# Patient Record
Sex: FEMALE | Race: OTHER | HISPANIC OR LATINO | URBAN - METROPOLITAN AREA
[De-identification: names, ages, dates, MRNs, and addresses within clinical notes are randomized per-mention and may not be internally consistent; named-entity substitution may affect disease eponyms.]

---

## 2017-01-27 ENCOUNTER — OUTPATIENT (OUTPATIENT)
Dept: OUTPATIENT SERVICES | Facility: HOSPITAL | Age: 67
LOS: 1 days | End: 2017-01-27
Payer: COMMERCIAL

## 2017-01-27 VITALS
RESPIRATION RATE: 16 BRPM | HEART RATE: 68 BPM | HEIGHT: 59 IN | TEMPERATURE: 99 F | SYSTOLIC BLOOD PRESSURE: 120 MMHG | DIASTOLIC BLOOD PRESSURE: 70 MMHG | WEIGHT: 128.97 LBS

## 2017-01-27 DIAGNOSIS — C00.9 MALIGNANT NEOPLASM OF LIP, UNSPECIFIED: ICD-10-CM

## 2017-01-27 DIAGNOSIS — Z98.890 OTHER SPECIFIED POSTPROCEDURAL STATES: Chronic | ICD-10-CM

## 2017-01-27 DIAGNOSIS — R74.8 ABNORMAL LEVELS OF OTHER SERUM ENZYMES: ICD-10-CM

## 2017-01-27 DIAGNOSIS — Z01.818 ENCOUNTER FOR OTHER PREPROCEDURAL EXAMINATION: ICD-10-CM

## 2017-01-27 DIAGNOSIS — Z90.710 ACQUIRED ABSENCE OF BOTH CERVIX AND UTERUS: Chronic | ICD-10-CM

## 2017-01-27 LAB
ALBUMIN SERPL ELPH-MCNC: 4.3 G/DL — SIGNIFICANT CHANGE UP (ref 3.3–5)
ALP SERPL-CCNC: 89 U/L — SIGNIFICANT CHANGE UP (ref 40–120)
ALT FLD-CCNC: 77 U/L — HIGH (ref 4–33)
AST SERPL-CCNC: 70 U/L — HIGH (ref 4–32)
BILIRUB SERPL-MCNC: 0.7 MG/DL — SIGNIFICANT CHANGE UP (ref 0.2–1.2)
BUN SERPL-MCNC: 17 MG/DL — SIGNIFICANT CHANGE UP (ref 7–23)
CALCIUM SERPL-MCNC: 9.6 MG/DL — SIGNIFICANT CHANGE UP (ref 8.4–10.5)
CHLORIDE SERPL-SCNC: 103 MMOL/L — SIGNIFICANT CHANGE UP (ref 98–107)
CO2 SERPL-SCNC: 26 MMOL/L — SIGNIFICANT CHANGE UP (ref 22–31)
CREAT SERPL-MCNC: 0.76 MG/DL — SIGNIFICANT CHANGE UP (ref 0.5–1.3)
GLUCOSE SERPL-MCNC: 87 MG/DL — SIGNIFICANT CHANGE UP (ref 70–99)
HCT VFR BLD CALC: 45.6 % — HIGH (ref 34.5–45)
HGB BLD-MCNC: 15.5 G/DL — SIGNIFICANT CHANGE UP (ref 11.5–15.5)
MCHC RBC-ENTMCNC: 30.8 PG — SIGNIFICANT CHANGE UP (ref 27–34)
MCHC RBC-ENTMCNC: 34 % — SIGNIFICANT CHANGE UP (ref 32–36)
MCV RBC AUTO: 90.5 FL — SIGNIFICANT CHANGE UP (ref 80–100)
PLATELET # BLD AUTO: 188 K/UL — SIGNIFICANT CHANGE UP (ref 150–400)
PMV BLD: 11.5 FL — SIGNIFICANT CHANGE UP (ref 7–13)
POTASSIUM SERPL-MCNC: 4.1 MMOL/L — SIGNIFICANT CHANGE UP (ref 3.5–5.3)
POTASSIUM SERPL-SCNC: 4.1 MMOL/L — SIGNIFICANT CHANGE UP (ref 3.5–5.3)
PROT SERPL-MCNC: 7.1 G/DL — SIGNIFICANT CHANGE UP (ref 6–8.3)
RBC # BLD: 5.04 M/UL — SIGNIFICANT CHANGE UP (ref 3.8–5.2)
RBC # FLD: 12.5 % — SIGNIFICANT CHANGE UP (ref 10.3–14.5)
SODIUM SERPL-SCNC: 143 MMOL/L — SIGNIFICANT CHANGE UP (ref 135–145)
WBC # BLD: 4.26 K/UL — SIGNIFICANT CHANGE UP (ref 3.8–10.5)
WBC # FLD AUTO: 4.26 K/UL — SIGNIFICANT CHANGE UP (ref 3.8–10.5)

## 2017-01-27 PROCEDURE — 93010 ELECTROCARDIOGRAM REPORT: CPT

## 2017-01-27 NOTE — H&P PST ADULT - MUSCULOSKELETAL
details… detailed exam no joint erythema/no calf tenderness/ROM intact/no joint warmth/no joint swelling

## 2017-01-27 NOTE — H&P PST ADULT - PROBLEM SELECTOR PLAN 1
Scheduled for Excision lower lip lesion on 2/3/2017.  Preop instructions provided and pt verbalizes understanding.  Labs done and results pending.  Famotidine provided with instructions. Medical clearance done. Will obtain.

## 2017-01-27 NOTE — H&P PST ADULT - FAMILY HISTORY
Father  Still living? Unknown  Family history of heart attack, Age at diagnosis: Age Unknown     Sibling  Still living? No  Family history of heart attack, Age at diagnosis: Age Unknown
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
## 2017-01-27 NOTE — H&P PST ADULT - PMH
Elevated liver enzymes  is being followed up by gasteroenterologist  Fibroid uterus    Hyperlipidemia

## 2017-01-27 NOTE — H&P PST ADULT - NSANTHOSAYNRD_GEN_A_CORE
No. DICKSON screening performed.  STOP BANG Legend: 0-2 = LOW Risk; 3-4 = INTERMEDIATE Risk; 5-8 = HIGH Risk

## 2017-01-27 NOTE — H&P PST ADULT - NEGATIVE ALLERGY TYPES
no reactions to medicines/no reactions to food/no indoor environmental allergies/no reactions to animals/no outdoor environmental allergies

## 2017-02-02 ENCOUNTER — RESULT REVIEW (OUTPATIENT)
Age: 67
End: 2017-02-02

## 2017-02-03 ENCOUNTER — OUTPATIENT (OUTPATIENT)
Dept: OUTPATIENT SERVICES | Facility: HOSPITAL | Age: 67
LOS: 1 days | Discharge: HOME | End: 2017-02-03
Payer: COMMERCIAL

## 2017-02-03 ENCOUNTER — APPOINTMENT (OUTPATIENT)
Dept: SURGERY | Facility: HOSPITAL | Age: 67
End: 2017-02-03

## 2017-02-03 ENCOUNTER — OTHER (OUTPATIENT)
Age: 67
End: 2017-02-03

## 2017-02-03 VITALS
DIASTOLIC BLOOD PRESSURE: 55 MMHG | WEIGHT: 128.97 LBS | RESPIRATION RATE: 16 BRPM | HEIGHT: 59 IN | OXYGEN SATURATION: 96 % | SYSTOLIC BLOOD PRESSURE: 117 MMHG | TEMPERATURE: 98 F | HEART RATE: 67 BPM

## 2017-02-03 VITALS
OXYGEN SATURATION: 100 % | RESPIRATION RATE: 19 BRPM | TEMPERATURE: 97 F | DIASTOLIC BLOOD PRESSURE: 50 MMHG | HEART RATE: 69 BPM | SYSTOLIC BLOOD PRESSURE: 108 MMHG

## 2017-02-03 DIAGNOSIS — C00.9 MALIGNANT NEOPLASM OF LIP, UNSPECIFIED: ICD-10-CM

## 2017-02-03 DIAGNOSIS — Z90.710 ACQUIRED ABSENCE OF BOTH CERVIX AND UTERUS: Chronic | ICD-10-CM

## 2017-02-03 DIAGNOSIS — Z98.890 OTHER SPECIFIED POSTPROCEDURAL STATES: Chronic | ICD-10-CM

## 2017-02-03 PROCEDURE — 40510 PARTIAL EXCISION OF LIP: CPT

## 2017-02-03 PROCEDURE — 88305 TISSUE EXAM BY PATHOLOGIST: CPT | Mod: 26

## 2017-02-03 PROCEDURE — 13132 CMPLX RPR F/C/C/M/N/AX/G/H/F: CPT | Mod: 59

## 2017-02-03 NOTE — ASU DISCHARGE PLAN (ADULT/PEDIATRIC). - INSTRUCTIONS
Advance to regular diet as tolerated. Keep well hydrated. Iced compresses or ice packs to lower lip to decrease swelling and pain several times per day for 15 minutes.

## 2017-02-03 NOTE — ASU DISCHARGE PLAN (ADULT/PEDIATRIC). - NOTIFY
Persistent Nausea and Vomiting/Inability to Tolerate Liquids or Foods/Pain not relieved by Medications/Swelling that continues/Bleeding that does not stop/Fever greater than 101/Unable to Urinate

## 2017-02-06 LAB — SURGICAL PATHOLOGY STUDY: SIGNIFICANT CHANGE UP

## 2017-02-07 PROBLEM — D25.9 LEIOMYOMA OF UTERUS, UNSPECIFIED: Chronic | Status: ACTIVE | Noted: 2017-01-27

## 2017-02-07 PROBLEM — R74.8 ABNORMAL LEVELS OF OTHER SERUM ENZYMES: Chronic | Status: ACTIVE | Noted: 2017-01-27

## 2017-02-16 ENCOUNTER — APPOINTMENT (OUTPATIENT)
Dept: SURGERY | Facility: CLINIC | Age: 67
End: 2017-02-16

## 2017-06-22 ENCOUNTER — APPOINTMENT (OUTPATIENT)
Dept: SURGERY | Facility: CLINIC | Age: 67
End: 2017-06-22

## 2017-06-29 ENCOUNTER — EMERGENCY (EMERGENCY)
Facility: HOSPITAL | Age: 67
LOS: 1 days | Discharge: HOME | End: 2017-06-29
Attending: EMERGENCY MEDICINE | Admitting: EMERGENCY MEDICINE
Payer: COMMERCIAL

## 2017-06-29 VITALS
TEMPERATURE: 98 F | RESPIRATION RATE: 18 BRPM | SYSTOLIC BLOOD PRESSURE: 141 MMHG | WEIGHT: 130.07 LBS | DIASTOLIC BLOOD PRESSURE: 71 MMHG | HEIGHT: 62 IN | HEART RATE: 85 BPM | OXYGEN SATURATION: 97 %

## 2017-06-29 DIAGNOSIS — Z90.710 ACQUIRED ABSENCE OF BOTH CERVIX AND UTERUS: Chronic | ICD-10-CM

## 2017-06-29 DIAGNOSIS — Z98.890 OTHER SPECIFIED POSTPROCEDURAL STATES: Chronic | ICD-10-CM

## 2017-06-29 LAB
ALBUMIN SERPL ELPH-MCNC: 4 G/DL — SIGNIFICANT CHANGE UP (ref 3.3–5)
ALP SERPL-CCNC: 80 U/L — SIGNIFICANT CHANGE UP (ref 30–120)
ALT FLD-CCNC: 40 U/L DA — SIGNIFICANT CHANGE UP (ref 10–60)
ANION GAP SERPL CALC-SCNC: 10 MMOL/L — SIGNIFICANT CHANGE UP (ref 5–17)
AST SERPL-CCNC: 46 U/L — HIGH (ref 10–40)
BASOPHILS # BLD AUTO: 0.1 K/UL — SIGNIFICANT CHANGE UP (ref 0–0.2)
BASOPHILS NFR BLD AUTO: 1 % — SIGNIFICANT CHANGE UP (ref 0–2)
BILIRUB SERPL-MCNC: 0.4 MG/DL — SIGNIFICANT CHANGE UP (ref 0.2–1.2)
BUN SERPL-MCNC: 12 MG/DL — SIGNIFICANT CHANGE UP (ref 7–23)
CALCIUM SERPL-MCNC: 8.9 MG/DL — SIGNIFICANT CHANGE UP (ref 8.4–10.5)
CHLORIDE SERPL-SCNC: 106 MMOL/L — SIGNIFICANT CHANGE UP (ref 96–108)
CO2 SERPL-SCNC: 28 MMOL/L — SIGNIFICANT CHANGE UP (ref 22–31)
CREAT SERPL-MCNC: 0.84 MG/DL — SIGNIFICANT CHANGE UP (ref 0.5–1.3)
EOSINOPHIL # BLD AUTO: 0.1 K/UL — SIGNIFICANT CHANGE UP (ref 0–0.5)
EOSINOPHIL NFR BLD AUTO: 1.7 % — SIGNIFICANT CHANGE UP (ref 0–6)
GLUCOSE SERPL-MCNC: 122 MG/DL — HIGH (ref 70–99)
HCT VFR BLD CALC: 46.6 % — HIGH (ref 34.5–45)
HGB BLD-MCNC: 15.2 G/DL — SIGNIFICANT CHANGE UP (ref 11.5–15.5)
LIDOCAIN IGE QN: 191 U/L — SIGNIFICANT CHANGE UP (ref 73–393)
LYMPHOCYTES # BLD AUTO: 1.2 K/UL — SIGNIFICANT CHANGE UP (ref 1–3.3)
LYMPHOCYTES # BLD AUTO: 19.1 % — SIGNIFICANT CHANGE UP (ref 13–44)
MCHC RBC-ENTMCNC: 29.8 PG — SIGNIFICANT CHANGE UP (ref 27–34)
MCHC RBC-ENTMCNC: 32.6 GM/DL — SIGNIFICANT CHANGE UP (ref 32–36)
MCV RBC AUTO: 91.6 FL — SIGNIFICANT CHANGE UP (ref 80–100)
MONOCYTES # BLD AUTO: 0.3 K/UL — SIGNIFICANT CHANGE UP (ref 0–0.9)
MONOCYTES NFR BLD AUTO: 4.5 % — SIGNIFICANT CHANGE UP (ref 2–14)
NEUTROPHILS # BLD AUTO: 4.8 K/UL — SIGNIFICANT CHANGE UP (ref 1.8–7.4)
NEUTROPHILS NFR BLD AUTO: 73.7 % — SIGNIFICANT CHANGE UP (ref 43–77)
PLATELET # BLD AUTO: 194 K/UL — SIGNIFICANT CHANGE UP (ref 150–400)
POTASSIUM SERPL-MCNC: 3.1 MMOL/L — LOW (ref 3.5–5.3)
POTASSIUM SERPL-SCNC: 3.1 MMOL/L — LOW (ref 3.5–5.3)
PROT SERPL-MCNC: 7.2 G/DL — SIGNIFICANT CHANGE UP (ref 6–8.3)
RBC # BLD: 5.09 M/UL — SIGNIFICANT CHANGE UP (ref 3.8–5.2)
RBC # FLD: 11.4 % — SIGNIFICANT CHANGE UP (ref 10.3–14.5)
SODIUM SERPL-SCNC: 144 MMOL/L — SIGNIFICANT CHANGE UP (ref 135–145)
WBC # BLD: 6.5 K/UL — SIGNIFICANT CHANGE UP (ref 3.8–10.5)
WBC # FLD AUTO: 6.5 K/UL — SIGNIFICANT CHANGE UP (ref 3.8–10.5)

## 2017-06-29 PROCEDURE — 99284 EMERGENCY DEPT VISIT MOD MDM: CPT

## 2017-06-29 RX ORDER — SODIUM CHLORIDE 9 MG/ML
1000 INJECTION INTRAMUSCULAR; INTRAVENOUS; SUBCUTANEOUS ONCE
Qty: 0 | Refills: 0 | Status: COMPLETED | OUTPATIENT
Start: 2017-06-29 | End: 2017-06-29

## 2017-06-29 RX ORDER — POTASSIUM CHLORIDE 20 MEQ
40 PACKET (EA) ORAL ONCE
Qty: 0 | Refills: 0 | Status: COMPLETED | OUTPATIENT
Start: 2017-06-29 | End: 2017-06-29

## 2017-06-29 RX ORDER — FAMOTIDINE 10 MG/ML
20 INJECTION INTRAVENOUS ONCE
Qty: 0 | Refills: 0 | Status: COMPLETED | OUTPATIENT
Start: 2017-06-29 | End: 2017-06-29

## 2017-06-29 RX ORDER — ONDANSETRON 8 MG/1
4 TABLET, FILM COATED ORAL ONCE
Qty: 0 | Refills: 0 | Status: COMPLETED | OUTPATIENT
Start: 2017-06-29 | End: 2017-06-29

## 2017-06-29 RX ADMIN — SODIUM CHLORIDE 2000 MILLILITER(S): 9 INJECTION INTRAMUSCULAR; INTRAVENOUS; SUBCUTANEOUS at 22:50

## 2017-06-29 RX ADMIN — ONDANSETRON 4 MILLIGRAM(S): 8 TABLET, FILM COATED ORAL at 22:50

## 2017-06-29 RX ADMIN — FAMOTIDINE 20 MILLIGRAM(S): 10 INJECTION INTRAVENOUS at 22:50

## 2017-06-29 NOTE — ED PROVIDER NOTE - OBJECTIVE STATEMENT
66 y/o F presents to the ED c/o multiple episodes of vomiting and diffuse abdominal pain since 2000 today. Pt felt well during the day and went out to eat tonight. She draink one mojito. Pt's daughter notes that she ate the same foods that pt did except pt had an empanada appetizer. Pt's daughter also notes that pt's liver enzymes have been elevated recently. Denies fever, chills, diarrhea or any other complaints. PMHx: Hyperlipidemia, fibroid uterus. PSHx: lip surgery. 66 y/o F presents to the ED c/o multiple episodes of vomiting and diffuse abdominal pain since 2000 today. Pt felt well during the day and went out to eat tonight. She drank one mojito. Pt's daughter notes that she ate the same foods that pt did except pt had an empanada appetizer. Pt's daughter also notes that pt's liver enzymes have been elevated recently. Denies fever, chills, diarrhea or any other complaints. PMHx: Hyperlipidemia, fibroid uterus. PSHx: lip surgery.

## 2017-06-30 VITALS
OXYGEN SATURATION: 93 % | TEMPERATURE: 98 F | HEART RATE: 79 BPM | DIASTOLIC BLOOD PRESSURE: 65 MMHG | RESPIRATION RATE: 19 BRPM | SYSTOLIC BLOOD PRESSURE: 109 MMHG

## 2017-06-30 PROCEDURE — 96375 TX/PRO/DX INJ NEW DRUG ADDON: CPT

## 2017-06-30 PROCEDURE — 85027 COMPLETE CBC AUTOMATED: CPT

## 2017-06-30 PROCEDURE — 83690 ASSAY OF LIPASE: CPT

## 2017-06-30 PROCEDURE — 96361 HYDRATE IV INFUSION ADD-ON: CPT

## 2017-06-30 PROCEDURE — 80053 COMPREHEN METABOLIC PANEL: CPT

## 2017-06-30 PROCEDURE — 96374 THER/PROPH/DIAG INJ IV PUSH: CPT

## 2017-06-30 PROCEDURE — 99284 EMERGENCY DEPT VISIT MOD MDM: CPT | Mod: 25

## 2017-06-30 RX ORDER — ONDANSETRON 8 MG/1
1 TABLET, FILM COATED ORAL
Qty: 10 | Refills: 0 | OUTPATIENT
Start: 2017-06-30

## 2017-06-30 RX ADMIN — Medication 40 MILLIEQUIVALENT(S): at 01:01

## 2017-10-24 ENCOUNTER — APPOINTMENT (OUTPATIENT)
Dept: SURGERY | Facility: CLINIC | Age: 67
End: 2017-10-24
Payer: COMMERCIAL

## 2017-10-24 PROCEDURE — 99213 OFFICE O/P EST LOW 20 MIN: CPT

## 2018-02-17 ENCOUNTER — RESULT REVIEW (OUTPATIENT)
Age: 68
End: 2018-02-17

## 2018-02-27 ENCOUNTER — OUTPATIENT (OUTPATIENT)
Dept: OUTPATIENT SERVICES | Facility: HOSPITAL | Age: 68
LOS: 1 days | End: 2018-02-27
Payer: COMMERCIAL

## 2018-02-27 ENCOUNTER — APPOINTMENT (OUTPATIENT)
Dept: MAMMOGRAPHY | Facility: CLINIC | Age: 68
End: 2018-02-27
Payer: COMMERCIAL

## 2018-02-27 ENCOUNTER — APPOINTMENT (OUTPATIENT)
Dept: ULTRASOUND IMAGING | Facility: CLINIC | Age: 68
End: 2018-02-27
Payer: COMMERCIAL

## 2018-02-27 DIAGNOSIS — E78.5 HYPERLIPIDEMIA, UNSPECIFIED: ICD-10-CM

## 2018-02-27 DIAGNOSIS — C08.9 MALIGNANT NEOPLASM OF MAJOR SALIVARY GLAND, UNSPECIFIED: ICD-10-CM

## 2018-02-27 DIAGNOSIS — Z00.8 ENCOUNTER FOR OTHER GENERAL EXAMINATION: ICD-10-CM

## 2018-02-27 DIAGNOSIS — Z90.710 ACQUIRED ABSENCE OF BOTH CERVIX AND UTERUS: Chronic | ICD-10-CM

## 2018-02-27 DIAGNOSIS — Z98.890 OTHER SPECIFIED POSTPROCEDURAL STATES: Chronic | ICD-10-CM

## 2018-02-27 PROCEDURE — G0279: CPT | Mod: 26

## 2018-02-27 PROCEDURE — 76641 ULTRASOUND BREAST COMPLETE: CPT | Mod: 26,50

## 2018-02-27 PROCEDURE — 76641 ULTRASOUND BREAST COMPLETE: CPT

## 2018-02-27 PROCEDURE — 77066 DX MAMMO INCL CAD BI: CPT | Mod: 26

## 2018-02-27 PROCEDURE — G0279: CPT

## 2018-02-27 PROCEDURE — 77066 DX MAMMO INCL CAD BI: CPT

## 2018-04-24 ENCOUNTER — APPOINTMENT (OUTPATIENT)
Dept: SURGERY | Facility: CLINIC | Age: 68
End: 2018-04-24
Payer: COMMERCIAL

## 2018-04-24 PROCEDURE — 99213 OFFICE O/P EST LOW 20 MIN: CPT

## 2018-05-13 ENCOUNTER — INPATIENT (INPATIENT)
Facility: HOSPITAL | Age: 68
LOS: 2 days | Discharge: HOME | DRG: 871 | End: 2018-05-16
Attending: INTERNAL MEDICINE | Admitting: INTERNAL MEDICINE
Payer: COMMERCIAL

## 2018-05-13 VITALS
RESPIRATION RATE: 20 BRPM | DIASTOLIC BLOOD PRESSURE: 57 MMHG | OXYGEN SATURATION: 87 % | SYSTOLIC BLOOD PRESSURE: 112 MMHG | HEART RATE: 112 BPM | TEMPERATURE: 99 F | HEIGHT: 61 IN | WEIGHT: 139.99 LBS

## 2018-05-13 DIAGNOSIS — R74.8 ABNORMAL LEVELS OF OTHER SERUM ENZYMES: ICD-10-CM

## 2018-05-13 DIAGNOSIS — Z90.710 ACQUIRED ABSENCE OF BOTH CERVIX AND UTERUS: Chronic | ICD-10-CM

## 2018-05-13 DIAGNOSIS — D25.9 LEIOMYOMA OF UTERUS, UNSPECIFIED: ICD-10-CM

## 2018-05-13 DIAGNOSIS — Z98.890 OTHER SPECIFIED POSTPROCEDURAL STATES: Chronic | ICD-10-CM

## 2018-05-13 DIAGNOSIS — J18.9 PNEUMONIA, UNSPECIFIED ORGANISM: ICD-10-CM

## 2018-05-13 DIAGNOSIS — Z29.9 ENCOUNTER FOR PROPHYLACTIC MEASURES, UNSPECIFIED: ICD-10-CM

## 2018-05-13 DIAGNOSIS — E78.5 HYPERLIPIDEMIA, UNSPECIFIED: ICD-10-CM

## 2018-05-13 DIAGNOSIS — J20.9 ACUTE BRONCHITIS, UNSPECIFIED: ICD-10-CM

## 2018-05-13 LAB
ALBUMIN SERPL ELPH-MCNC: 3.8 G/DL — SIGNIFICANT CHANGE UP (ref 3.3–5)
ALP SERPL-CCNC: 64 U/L — SIGNIFICANT CHANGE UP (ref 30–120)
ALT FLD-CCNC: 27 U/L DA — SIGNIFICANT CHANGE UP (ref 10–60)
ANION GAP SERPL CALC-SCNC: 10 MMOL/L — SIGNIFICANT CHANGE UP (ref 5–17)
AST SERPL-CCNC: 21 U/L — SIGNIFICANT CHANGE UP (ref 10–40)
BILIRUB SERPL-MCNC: 0.9 MG/DL — SIGNIFICANT CHANGE UP (ref 0.2–1.2)
BUN SERPL-MCNC: 16 MG/DL — SIGNIFICANT CHANGE UP (ref 7–23)
CALCIUM SERPL-MCNC: 8.8 MG/DL — SIGNIFICANT CHANGE UP (ref 8.4–10.5)
CHLORIDE SERPL-SCNC: 102 MMOL/L — SIGNIFICANT CHANGE UP (ref 96–108)
CK SERPL-CCNC: 225 U/L — HIGH (ref 26–192)
CO2 SERPL-SCNC: 24 MMOL/L — SIGNIFICANT CHANGE UP (ref 22–31)
CREAT SERPL-MCNC: 0.76 MG/DL — SIGNIFICANT CHANGE UP (ref 0.5–1.3)
GLUCOSE SERPL-MCNC: 149 MG/DL — HIGH (ref 70–99)
HCT VFR BLD CALC: 39.2 % — SIGNIFICANT CHANGE UP (ref 34.5–45)
HGB BLD-MCNC: 13.9 G/DL — SIGNIFICANT CHANGE UP (ref 11.5–15.5)
LACTATE SERPL-SCNC: 2.2 MMOL/L — HIGH (ref 0.7–2)
LACTATE SERPL-SCNC: 2.9 MMOL/L — HIGH (ref 0.7–2)
LACTATE SERPL-SCNC: 3.6 MMOL/L — HIGH (ref 0.7–2)
LACTATE SERPL-SCNC: 5.4 MMOL/L — CRITICAL HIGH (ref 0.7–2)
MCHC RBC-ENTMCNC: 30.2 PG — SIGNIFICANT CHANGE UP (ref 27–34)
MCHC RBC-ENTMCNC: 35.4 GM/DL — SIGNIFICANT CHANGE UP (ref 32–36)
MCV RBC AUTO: 85.2 FL — SIGNIFICANT CHANGE UP (ref 80–100)
PLATELET # BLD AUTO: 214 K/UL — SIGNIFICANT CHANGE UP (ref 150–400)
POTASSIUM SERPL-MCNC: 3.8 MMOL/L — SIGNIFICANT CHANGE UP (ref 3.5–5.3)
POTASSIUM SERPL-SCNC: 3.8 MMOL/L — SIGNIFICANT CHANGE UP (ref 3.5–5.3)
PROT SERPL-MCNC: 7.1 G/DL — SIGNIFICANT CHANGE UP (ref 6–8.3)
RAPID RVP RESULT: DETECTED
RBC # BLD: 4.6 M/UL — SIGNIFICANT CHANGE UP (ref 3.8–5.2)
RBC # FLD: 10.5 % — SIGNIFICANT CHANGE UP (ref 10.3–14.5)
RV+EV RNA SPEC QL NAA+PROBE: DETECTED
SODIUM SERPL-SCNC: 136 MMOL/L — SIGNIFICANT CHANGE UP (ref 135–145)
TROPONIN I SERPL-MCNC: 0.04 NG/ML — SIGNIFICANT CHANGE UP (ref 0.02–0.06)
WBC # BLD: 12 K/UL — HIGH (ref 3.8–10.5)
WBC # FLD AUTO: 12 K/UL — HIGH (ref 3.8–10.5)

## 2018-05-13 PROCEDURE — 71046 X-RAY EXAM CHEST 2 VIEWS: CPT | Mod: 26

## 2018-05-13 PROCEDURE — 93010 ELECTROCARDIOGRAM REPORT: CPT

## 2018-05-13 PROCEDURE — 71250 CT THORAX DX C-: CPT | Mod: 26

## 2018-05-13 PROCEDURE — 99285 EMERGENCY DEPT VISIT HI MDM: CPT

## 2018-05-13 PROCEDURE — 12345: CPT | Mod: NC

## 2018-05-13 PROCEDURE — 99223 1ST HOSP IP/OBS HIGH 75: CPT | Mod: AI

## 2018-05-13 RX ORDER — ENOXAPARIN SODIUM 100 MG/ML
40 INJECTION SUBCUTANEOUS DAILY
Qty: 0 | Refills: 0 | Status: DISCONTINUED | OUTPATIENT
Start: 2018-05-13 | End: 2018-05-16

## 2018-05-13 RX ORDER — BENZOCAINE AND MENTHOL 5; 1 G/100ML; G/100ML
1 LIQUID ORAL
Qty: 0 | Refills: 0 | Status: DISCONTINUED | OUTPATIENT
Start: 2018-05-13 | End: 2018-05-16

## 2018-05-13 RX ORDER — SODIUM CHLORIDE 9 MG/ML
1000 INJECTION INTRAMUSCULAR; INTRAVENOUS; SUBCUTANEOUS
Qty: 0 | Refills: 0 | Status: DISCONTINUED | OUTPATIENT
Start: 2018-05-13 | End: 2018-05-15

## 2018-05-13 RX ORDER — IPRATROPIUM/ALBUTEROL SULFATE 18-103MCG
3 AEROSOL WITH ADAPTER (GRAM) INHALATION EVERY 6 HOURS
Qty: 0 | Refills: 0 | Status: DISCONTINUED | OUTPATIENT
Start: 2018-05-13 | End: 2018-05-16

## 2018-05-13 RX ORDER — SODIUM CHLORIDE 9 MG/ML
1000 INJECTION INTRAMUSCULAR; INTRAVENOUS; SUBCUTANEOUS
Qty: 0 | Refills: 0 | Status: DISCONTINUED | OUTPATIENT
Start: 2018-05-13 | End: 2018-05-13

## 2018-05-13 RX ORDER — SODIUM CHLORIDE 9 MG/ML
1000 INJECTION INTRAMUSCULAR; INTRAVENOUS; SUBCUTANEOUS ONCE
Qty: 0 | Refills: 0 | Status: COMPLETED | OUTPATIENT
Start: 2018-05-13 | End: 2018-05-13

## 2018-05-13 RX ORDER — PANTOPRAZOLE SODIUM 20 MG/1
40 TABLET, DELAYED RELEASE ORAL DAILY
Qty: 0 | Refills: 0 | Status: DISCONTINUED | OUTPATIENT
Start: 2018-05-13 | End: 2018-05-15

## 2018-05-13 RX ORDER — IPRATROPIUM/ALBUTEROL SULFATE 18-103MCG
3 AEROSOL WITH ADAPTER (GRAM) INHALATION ONCE
Qty: 0 | Refills: 0 | Status: COMPLETED | OUTPATIENT
Start: 2018-05-13 | End: 2018-05-13

## 2018-05-13 RX ORDER — ACETAMINOPHEN 500 MG
325 TABLET ORAL EVERY 4 HOURS
Qty: 0 | Refills: 0 | Status: DISCONTINUED | OUTPATIENT
Start: 2018-05-13 | End: 2018-05-16

## 2018-05-13 RX ORDER — ONDANSETRON 8 MG/1
4 TABLET, FILM COATED ORAL ONCE
Qty: 0 | Refills: 0 | Status: DISCONTINUED | OUTPATIENT
Start: 2018-05-13 | End: 2018-05-16

## 2018-05-13 RX ADMIN — Medication 3 MILLILITER(S): at 06:20

## 2018-05-13 RX ADMIN — PANTOPRAZOLE SODIUM 40 MILLIGRAM(S): 20 TABLET, DELAYED RELEASE ORAL at 11:32

## 2018-05-13 RX ADMIN — Medication 125 MILLIGRAM(S): at 06:34

## 2018-05-13 RX ADMIN — BENZOCAINE AND MENTHOL 1 LOZENGE: 5; 1 LIQUID ORAL at 19:05

## 2018-05-13 RX ADMIN — Medication 3 MILLILITER(S): at 06:53

## 2018-05-13 RX ADMIN — ENOXAPARIN SODIUM 40 MILLIGRAM(S): 100 INJECTION SUBCUTANEOUS at 11:32

## 2018-05-13 RX ADMIN — Medication 40 MILLIGRAM(S): at 09:54

## 2018-05-13 RX ADMIN — Medication 3 MILLILITER(S): at 13:38

## 2018-05-13 RX ADMIN — SODIUM CHLORIDE 1000 MILLILITER(S): 9 INJECTION INTRAMUSCULAR; INTRAVENOUS; SUBCUTANEOUS at 06:26

## 2018-05-13 RX ADMIN — Medication 3 MILLILITER(S): at 06:50

## 2018-05-13 RX ADMIN — SODIUM CHLORIDE 1000 MILLILITER(S): 9 INJECTION INTRAMUSCULAR; INTRAVENOUS; SUBCUTANEOUS at 07:23

## 2018-05-13 RX ADMIN — SODIUM CHLORIDE 1000 MILLILITER(S): 9 INJECTION INTRAMUSCULAR; INTRAVENOUS; SUBCUTANEOUS at 15:13

## 2018-05-13 RX ADMIN — Medication 3 MILLILITER(S): at 19:52

## 2018-05-13 NOTE — ED PROVIDER NOTE - MEDICAL DECISION MAKING DETAILS
labs cxr ekg enzymes cultures lactate Duo x 3, Solumedrol labs cxr ekg enzymes cultures lactate Duo x 3, Solumedrol, Levaquin

## 2018-05-13 NOTE — H&P ADULT - PMH
Elevated liver enzymes  is being followed up by gasteroenterologist  Fibroid uterus    Hyperlipidemia mother

## 2018-05-13 NOTE — CONSULT NOTE ADULT - ASSESSMENT
Pt with acute pneumonia, bronchitis. Pt with acute pneumonia, bronchitis.  Mild sepsis with borderline lactate.

## 2018-05-13 NOTE — ED PROVIDER NOTE - CARE PLAN
Principal Discharge DX:	Upper respiratory infection  Goal:	R/O Pneumonia  Secondary Diagnosis:	Hyperactive airway disease Principal Discharge DX:	Pneumonia  Goal:	R/O Pneumonia  Secondary Diagnosis:	Hyperactive airway disease
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
## 2018-05-13 NOTE — ED ADULT NURSE REASSESSMENT NOTE - NS ED NURSE REASSESS COMMENT FT1
Pt coughed moderate amount green sputum. Sputum c/s sent to lab.
as per pt, shortness of breath, productive cough w/ white sputum since yesterday. Seen at clinic who started her on po AB. Bilat lungs diminished BS throughout w/ clear sounds to anterior upper left lobe. O2 sat 86% on room air. Started on Duoneb STAT - O2 sat 100%. Pt afebrile at triage. RR 21-23. Skin warm, dry.
lungs diminished to right base otherwise clear to ascultation denies pains feeling better now pt pending observation and dispo spouse at bedside

## 2018-05-13 NOTE — H&P ADULT - PROBLEM SELECTOR PLAN 1
WBC -  Lactate- 5.4   CT chest showed    IV Levofloxacin , given 4 litres of IV NS , solumedrol and bronchodilators WBC -  Lactate- 5.4   CT chest showed Complete collapse of the right middle lobe on the current exam. No   obvious mass at the right hilum. Right middle lobe bronchus is aerated.    No focal lung consolidation.    Unchanged 7.5 mm nodule in the left lower lobe compared to prior exam   from 11/19/2016.  Continue with  IV Levofloxacin , given 4 litres of IV NS , solumedrol and bronchodilators.   Monitor Telemetry.

## 2018-05-13 NOTE — ED PROVIDER NOTE - CONSTITUTIONAL, MLM
normal... Well appearing, well nourished, awake, alert, oriented to person, place, time/situation and in  moderate distress.

## 2018-05-13 NOTE — H&P ADULT - ASSESSMENT
67 y/o With PMHx of  Elevated liver enzymes, Fibroid uterus  Hyperlipidemia came in to Ed with cough and Shortness of breath and is admitted for Sepsis due toPneumonia and acute bronchitis.

## 2018-05-13 NOTE — ED PROVIDER NOTE - OBJECTIVE STATEMENT
69 y/o WF H/O Elevated liver enzymes, Fibroid uterus  Hyperlipidemia.  C/C SOB, cough with productive green sputum since Saturday morning. She was seen by urgent care and was started on Amoxil and hydrocodone for URI. Last evening her breathing was more labored with wheezing. She was chest pain with cough, no fever, no chills, no radiation of pain, no nausea, no vomiting. 69 y/o WF H/O Elevated liver enzymes, Fibroid uterus  Hyperlipidemia.  C/C SOB, cough with productive green sputum since Saturday morning. She was seen by urgent care and was started on Amoxil and hydrocodone for URI. Last evening her breathing was more labored with wheezing. She was chest pain with cough, no fever, no chills, no radiation of pain, no nausea, no vomiting. Her room air Sat in triage was 86%.

## 2018-05-13 NOTE — H&P ADULT - HISTORY OF PRESENT ILLNESS
69 y/o With PMHx of  Elevated liver enzymes, Fibroid uterus  Hyperlipidemia came in to Ed with cough and Shortness of breath. .  C/C SOB, cough with productive green sputum since Saturday morning. She was seen by urgent care and was started on Amoxil and hydrocodone for URI. Last evening her breathing was more labored with wheezing. She was chest pain with cough, no fever, no chills, no radiation of pain, no nausea, no vomiting. Her room air Sat in triage was 86%. 67 y/o With PMHx of  Elevated liver enzymes, Fibroid uterus  Hyperlipidemia came in to Ed with cough and Shortness of breath. . Patient started having  cough since friday night.It worsened on saturday , developed  productive green sputum since Saturday morning. She was seen by urgent care and was started on Amoxicillin  and hydrocodone for URI. Last evening - saturday night her breathing was more labored with wheezing. She started having pleuritic  chest pain with cough,  chills, and nausea, no vomiting.  She also c/o throat pain and irritation . She denied in Chest pain , palpitation , dizziness or headache .   In ED Vital Signs Last 24 Hrs T(C): 36.9 (13 May 2018 07:34), Max: 37.4 (13 May 2018 05:59) T(F): 98.5 (13 May 2018 07:34), Max: 99.3 (13 May 2018 05:59) HR: 105 (13 May 2018 16:20) (99 - 118) BP: 130/54 (13 May 2018 16:20) (112/56 - 130/54) BP(mean): -- ABP: -- ABP(mean): -- RR: 22 (13 May 2018 10:12) (20 - 22) SpO2: 94% (13 May 2018 16:20) (87% - 100%)  She was started IV Levofloxacin , given 4 litres of IV NS , solumedrol and bronchodilators  .  Her lactate went up to 5.4.  She was evaluated by Pulmonary Dr Calix.

## 2018-05-13 NOTE — H&P ADULT - PROBLEM SELECTOR PLAN 2
Follow up   BC , Sputum cultures   Urine for legionella.   C/W IV Levofloxacin.   Monitor Fever curve .   Pulmonary evaluation from Dr rose appreciated.

## 2018-05-14 DIAGNOSIS — A41.9 SEPSIS, UNSPECIFIED ORGANISM: ICD-10-CM

## 2018-05-14 LAB
ALBUMIN SERPL ELPH-MCNC: 2.8 G/DL — LOW (ref 3.3–5)
ALP SERPL-CCNC: 47 U/L — SIGNIFICANT CHANGE UP (ref 30–120)
ALT FLD-CCNC: 20 U/L DA — SIGNIFICANT CHANGE UP (ref 10–60)
ANION GAP SERPL CALC-SCNC: 7 MMOL/L — SIGNIFICANT CHANGE UP (ref 5–17)
AST SERPL-CCNC: 11 U/L — SIGNIFICANT CHANGE UP (ref 10–40)
BASOPHILS # BLD AUTO: 0 K/UL — SIGNIFICANT CHANGE UP (ref 0–0.2)
BASOPHILS NFR BLD AUTO: 0.2 % — SIGNIFICANT CHANGE UP (ref 0–2)
BILIRUB SERPL-MCNC: 0.3 MG/DL — SIGNIFICANT CHANGE UP (ref 0.2–1.2)
BUN SERPL-MCNC: 13 MG/DL — SIGNIFICANT CHANGE UP (ref 7–23)
CALCIUM SERPL-MCNC: 8.3 MG/DL — LOW (ref 8.4–10.5)
CHLORIDE SERPL-SCNC: 112 MMOL/L — HIGH (ref 96–108)
CHOLEST SERPL-MCNC: 174 MG/DL — SIGNIFICANT CHANGE UP (ref 10–199)
CO2 SERPL-SCNC: 25 MMOL/L — SIGNIFICANT CHANGE UP (ref 22–31)
CREAT SERPL-MCNC: 0.7 MG/DL — SIGNIFICANT CHANGE UP (ref 0.5–1.3)
EOSINOPHIL # BLD AUTO: 0 K/UL — SIGNIFICANT CHANGE UP (ref 0–0.5)
EOSINOPHIL NFR BLD AUTO: 0.1 % — SIGNIFICANT CHANGE UP (ref 0–6)
GLUCOSE SERPL-MCNC: 127 MG/DL — HIGH (ref 70–99)
GRAM STN FLD: SIGNIFICANT CHANGE UP
HBA1C BLD-MCNC: 5.7 % — HIGH (ref 4–5.6)
HCT VFR BLD CALC: 33.9 % — LOW (ref 34.5–45)
HDLC SERPL-MCNC: 43 MG/DL — SIGNIFICANT CHANGE UP (ref 40–125)
HGB BLD-MCNC: 12 G/DL — SIGNIFICANT CHANGE UP (ref 11.5–15.5)
LACTATE SERPL-SCNC: 1.4 MMOL/L — SIGNIFICANT CHANGE UP (ref 0.7–2)
LEGIONELLA AG UR QL: NEGATIVE — SIGNIFICANT CHANGE UP
LIPID PNL WITH DIRECT LDL SERPL: 115 MG/DL — SIGNIFICANT CHANGE UP
LYMPHOCYTES # BLD AUTO: 0.9 K/UL — LOW (ref 1–3.3)
LYMPHOCYTES # BLD AUTO: 7.7 % — LOW (ref 13–44)
MCHC RBC-ENTMCNC: 31.3 PG — SIGNIFICANT CHANGE UP (ref 27–34)
MCHC RBC-ENTMCNC: 35.5 GM/DL — SIGNIFICANT CHANGE UP (ref 32–36)
MCV RBC AUTO: 88.3 FL — SIGNIFICANT CHANGE UP (ref 80–100)
MONOCYTES # BLD AUTO: 0.7 K/UL — SIGNIFICANT CHANGE UP (ref 0–0.9)
MONOCYTES NFR BLD AUTO: 6 % — SIGNIFICANT CHANGE UP (ref 2–14)
NEUTROPHILS # BLD AUTO: 9.6 K/UL — HIGH (ref 1.8–7.4)
NEUTROPHILS NFR BLD AUTO: 86 % — HIGH (ref 43–77)
PLATELET # BLD AUTO: 165 K/UL — SIGNIFICANT CHANGE UP (ref 150–400)
POTASSIUM SERPL-MCNC: 3.7 MMOL/L — SIGNIFICANT CHANGE UP (ref 3.5–5.3)
POTASSIUM SERPL-SCNC: 3.7 MMOL/L — SIGNIFICANT CHANGE UP (ref 3.5–5.3)
PROT SERPL-MCNC: 5.7 G/DL — LOW (ref 6–8.3)
RBC # BLD: 3.84 M/UL — SIGNIFICANT CHANGE UP (ref 3.8–5.2)
RBC # FLD: 11 % — SIGNIFICANT CHANGE UP (ref 10.3–14.5)
SODIUM SERPL-SCNC: 144 MMOL/L — SIGNIFICANT CHANGE UP (ref 135–145)
SPECIMEN SOURCE: SIGNIFICANT CHANGE UP
TOTAL CHOLESTEROL/HDL RATIO MEASUREMENT: 4 RATIO — SIGNIFICANT CHANGE UP (ref 3.3–7.1)
TRIGL SERPL-MCNC: 78 MG/DL — SIGNIFICANT CHANGE UP (ref 10–149)
TSH SERPL-MCNC: 0.68 UIU/ML — SIGNIFICANT CHANGE UP (ref 0.27–4.2)
WBC # BLD: 11.2 K/UL — HIGH (ref 3.8–10.5)
WBC # FLD AUTO: 11.2 K/UL — HIGH (ref 3.8–10.5)

## 2018-05-14 PROCEDURE — 71045 X-RAY EXAM CHEST 1 VIEW: CPT | Mod: 26

## 2018-05-14 PROCEDURE — 99233 SBSQ HOSP IP/OBS HIGH 50: CPT

## 2018-05-14 RX ADMIN — PANTOPRAZOLE SODIUM 40 MILLIGRAM(S): 20 TABLET, DELAYED RELEASE ORAL at 12:46

## 2018-05-14 RX ADMIN — Medication 3 MILLILITER(S): at 01:17

## 2018-05-14 RX ADMIN — Medication 200 MILLIGRAM(S): at 23:16

## 2018-05-14 RX ADMIN — Medication 3 MILLILITER(S): at 13:34

## 2018-05-14 RX ADMIN — Medication 3 MILLILITER(S): at 07:46

## 2018-05-14 RX ADMIN — ENOXAPARIN SODIUM 40 MILLIGRAM(S): 100 INJECTION SUBCUTANEOUS at 12:45

## 2018-05-14 RX ADMIN — Medication 40 MILLIGRAM(S): at 05:57

## 2018-05-14 RX ADMIN — Medication 3 MILLILITER(S): at 19:41

## 2018-05-14 RX ADMIN — SODIUM CHLORIDE 100 MILLILITER(S): 9 INJECTION INTRAMUSCULAR; INTRAVENOUS; SUBCUTANEOUS at 16:07

## 2018-05-14 RX ADMIN — Medication 200 MILLIGRAM(S): at 17:13

## 2018-05-15 ENCOUNTER — RESULT REVIEW (OUTPATIENT)
Age: 68
End: 2018-05-15

## 2018-05-15 LAB
CULTURE RESULTS: SIGNIFICANT CHANGE UP
FOLATE SERPL-MCNC: 8.8 NG/ML — SIGNIFICANT CHANGE UP
HCT VFR BLD CALC: 34.8 % — SIGNIFICANT CHANGE UP (ref 34.5–45)
HGB BLD-MCNC: 11.8 G/DL — SIGNIFICANT CHANGE UP (ref 11.5–15.5)
IRON SATN MFR SERPL: 38 UG/DL — SIGNIFICANT CHANGE UP (ref 30–160)
MCHC RBC-ENTMCNC: 29.6 PG — SIGNIFICANT CHANGE UP (ref 27–34)
MCHC RBC-ENTMCNC: 33.8 GM/DL — SIGNIFICANT CHANGE UP (ref 32–36)
MCV RBC AUTO: 87.5 FL — SIGNIFICANT CHANGE UP (ref 80–100)
PLATELET # BLD AUTO: 172 K/UL — SIGNIFICANT CHANGE UP (ref 150–400)
RBC # BLD: 3.98 M/UL — SIGNIFICANT CHANGE UP (ref 3.8–5.2)
RBC # FLD: 11 % — SIGNIFICANT CHANGE UP (ref 10.3–14.5)
SPECIMEN SOURCE: SIGNIFICANT CHANGE UP
VIT B12 SERPL-MCNC: 721 PG/ML — SIGNIFICANT CHANGE UP (ref 232–1245)
WBC # BLD: 8.4 K/UL — SIGNIFICANT CHANGE UP (ref 3.8–10.5)
WBC # FLD AUTO: 8.4 K/UL — SIGNIFICANT CHANGE UP (ref 3.8–10.5)

## 2018-05-15 PROCEDURE — 88305 TISSUE EXAM BY PATHOLOGIST: CPT | Mod: 26

## 2018-05-15 PROCEDURE — 99233 SBSQ HOSP IP/OBS HIGH 50: CPT

## 2018-05-15 PROCEDURE — 71046 X-RAY EXAM CHEST 2 VIEWS: CPT | Mod: 26

## 2018-05-15 PROCEDURE — 88108 CYTOPATH CONCENTRATE TECH: CPT | Mod: 26

## 2018-05-15 RX ORDER — PANTOPRAZOLE SODIUM 20 MG/1
40 TABLET, DELAYED RELEASE ORAL
Qty: 0 | Refills: 0 | Status: DISCONTINUED | OUTPATIENT
Start: 2018-05-15 | End: 2018-05-16

## 2018-05-15 RX ADMIN — Medication 3 MILLILITER(S): at 13:55

## 2018-05-15 RX ADMIN — Medication 3 MILLILITER(S): at 18:34

## 2018-05-15 RX ADMIN — Medication 200 MILLIGRAM(S): at 17:31

## 2018-05-15 RX ADMIN — Medication 200 MILLIGRAM(S): at 11:43

## 2018-05-15 RX ADMIN — ENOXAPARIN SODIUM 40 MILLIGRAM(S): 100 INJECTION SUBCUTANEOUS at 11:43

## 2018-05-15 RX ADMIN — Medication 200 MILLIGRAM(S): at 05:47

## 2018-05-15 RX ADMIN — Medication 200 MILLIGRAM(S): at 23:16

## 2018-05-15 RX ADMIN — Medication 40 MILLIGRAM(S): at 05:47

## 2018-05-15 RX ADMIN — Medication 3 MILLILITER(S): at 07:38

## 2018-05-16 ENCOUNTER — TRANSCRIPTION ENCOUNTER (OUTPATIENT)
Age: 68
End: 2018-05-16

## 2018-05-16 VITALS
OXYGEN SATURATION: 94 % | DIASTOLIC BLOOD PRESSURE: 66 MMHG | SYSTOLIC BLOOD PRESSURE: 117 MMHG | TEMPERATURE: 99 F | HEART RATE: 105 BPM | RESPIRATION RATE: 18 BRPM

## 2018-05-16 PROCEDURE — 87449 NOS EACH ORGANISM AG IA: CPT

## 2018-05-16 PROCEDURE — 87633 RESP VIRUS 12-25 TARGETS: CPT

## 2018-05-16 PROCEDURE — 71045 X-RAY EXAM CHEST 1 VIEW: CPT

## 2018-05-16 PROCEDURE — 85027 COMPLETE CBC AUTOMATED: CPT

## 2018-05-16 PROCEDURE — 88305 TISSUE EXAM BY PATHOLOGIST: CPT

## 2018-05-16 PROCEDURE — 87040 BLOOD CULTURE FOR BACTERIA: CPT

## 2018-05-16 PROCEDURE — 93005 ELECTROCARDIOGRAM TRACING: CPT

## 2018-05-16 PROCEDURE — 71250 CT THORAX DX C-: CPT

## 2018-05-16 PROCEDURE — 80053 COMPREHEN METABOLIC PANEL: CPT

## 2018-05-16 PROCEDURE — 87486 CHLMYD PNEUM DNA AMP PROBE: CPT

## 2018-05-16 PROCEDURE — 87798 DETECT AGENT NOS DNA AMP: CPT

## 2018-05-16 PROCEDURE — 88108 CYTOPATH CONCENTRATE TECH: CPT

## 2018-05-16 PROCEDURE — 82550 ASSAY OF CK (CPK): CPT

## 2018-05-16 PROCEDURE — 96361 HYDRATE IV INFUSION ADD-ON: CPT

## 2018-05-16 PROCEDURE — 99239 HOSP IP/OBS DSCHRG MGMT >30: CPT

## 2018-05-16 PROCEDURE — 82607 VITAMIN B-12: CPT

## 2018-05-16 PROCEDURE — 80061 LIPID PANEL: CPT

## 2018-05-16 PROCEDURE — 94640 AIRWAY INHALATION TREATMENT: CPT

## 2018-05-16 PROCEDURE — 87581 M.PNEUMON DNA AMP PROBE: CPT

## 2018-05-16 PROCEDURE — 83605 ASSAY OF LACTIC ACID: CPT

## 2018-05-16 PROCEDURE — 71046 X-RAY EXAM CHEST 2 VIEWS: CPT

## 2018-05-16 PROCEDURE — 84484 ASSAY OF TROPONIN QUANT: CPT

## 2018-05-16 PROCEDURE — 99285 EMERGENCY DEPT VISIT HI MDM: CPT | Mod: 25

## 2018-05-16 PROCEDURE — 96374 THER/PROPH/DIAG INJ IV PUSH: CPT

## 2018-05-16 PROCEDURE — 83540 ASSAY OF IRON: CPT

## 2018-05-16 PROCEDURE — 82746 ASSAY OF FOLIC ACID SERUM: CPT

## 2018-05-16 PROCEDURE — 87070 CULTURE OTHR SPECIMN AEROBIC: CPT

## 2018-05-16 PROCEDURE — 84443 ASSAY THYROID STIM HORMONE: CPT

## 2018-05-16 PROCEDURE — 83036 HEMOGLOBIN GLYCOSYLATED A1C: CPT

## 2018-05-16 PROCEDURE — 94664 DEMO&/EVAL PT USE INHALER: CPT

## 2018-05-16 PROCEDURE — 36415 COLL VENOUS BLD VENIPUNCTURE: CPT

## 2018-05-16 RX ORDER — PNEUMOCOCCAL 13-VALENT CONJUGATE VACCINE 2.2; 2.2; 2.2; 2.2; 2.2; 4.4; 2.2; 2.2; 2.2; 2.2; 2.2; 2.2; 2.2 UG/.5ML; UG/.5ML; UG/.5ML; UG/.5ML; UG/.5ML; UG/.5ML; UG/.5ML; UG/.5ML; UG/.5ML; UG/.5ML; UG/.5ML; UG/.5ML; UG/.5ML
0.5 INJECTION, SUSPENSION INTRAMUSCULAR ONCE
Qty: 0 | Refills: 0 | Status: COMPLETED | OUTPATIENT
Start: 2018-05-16 | End: 2018-05-16

## 2018-05-16 RX ORDER — IPRATROPIUM/ALBUTEROL SULFATE 18-103MCG
1 AEROSOL WITH ADAPTER (GRAM) INHALATION
Qty: 1 | Refills: 0 | OUTPATIENT
Start: 2018-05-16 | End: 2018-06-14

## 2018-05-16 RX ORDER — SODIUM CHLORIDE 9 MG/ML
1000 INJECTION INTRAMUSCULAR; INTRAVENOUS; SUBCUTANEOUS ONCE
Qty: 0 | Refills: 0 | Status: COMPLETED | OUTPATIENT
Start: 2018-05-16 | End: 2018-05-16

## 2018-05-16 RX ORDER — IPRATROPIUM/ALBUTEROL SULFATE 18-103MCG
1 AEROSOL WITH ADAPTER (GRAM) INHALATION
Qty: 0 | Refills: 0 | COMMUNITY
Start: 2018-05-16

## 2018-05-16 RX ORDER — PANTOPRAZOLE SODIUM 20 MG/1
1 TABLET, DELAYED RELEASE ORAL
Qty: 7 | Refills: 0 | OUTPATIENT
Start: 2018-05-16 | End: 2018-05-22

## 2018-05-16 RX ORDER — ACETAMINOPHEN 500 MG
1 TABLET ORAL
Qty: 0 | Refills: 0 | COMMUNITY
Start: 2018-05-16

## 2018-05-16 RX ADMIN — PANTOPRAZOLE SODIUM 40 MILLIGRAM(S): 20 TABLET, DELAYED RELEASE ORAL at 06:00

## 2018-05-16 RX ADMIN — SODIUM CHLORIDE 2000 MILLILITER(S): 9 INJECTION INTRAMUSCULAR; INTRAVENOUS; SUBCUTANEOUS at 12:22

## 2018-05-16 RX ADMIN — Medication 3 MILLILITER(S): at 13:50

## 2018-05-16 RX ADMIN — Medication 3 MILLILITER(S): at 00:33

## 2018-05-16 RX ADMIN — Medication 200 MILLIGRAM(S): at 05:52

## 2018-05-16 RX ADMIN — Medication 200 MILLIGRAM(S): at 11:40

## 2018-05-16 RX ADMIN — Medication 40 MILLIGRAM(S): at 05:52

## 2018-05-16 RX ADMIN — ENOXAPARIN SODIUM 40 MILLIGRAM(S): 100 INJECTION SUBCUTANEOUS at 11:40

## 2018-05-16 RX ADMIN — Medication 3 MILLILITER(S): at 08:04

## 2018-05-16 RX ADMIN — Medication 200 MILLIGRAM(S): at 17:15

## 2018-05-16 NOTE — PROGRESS NOTE ADULT - PROBLEM SELECTOR PLAN 4
Continue with Life style modification.
Continue with Life style modification.
Needs close followup  May need PET scan and possible bronch as outpt.
Needs close followup  May need PET scan and possible bronch as outpt.  Sputum cytology
Needs close followup  May need PET scan and possible bronch as outpt.  Sputum cytology

## 2018-05-16 NOTE — DISCHARGE NOTE ADULT - ADDITIONAL INSTRUCTIONS
You received the Prevnar vaccine during admission. You should see your PMD or Pulmonologist in a few months for the Pneumovax vaccine.    see either the pulmonologist or pmd in the next week.

## 2018-05-16 NOTE — DISCHARGE NOTE ADULT - MEDICATION SUMMARY - MEDICATIONS TO TAKE
I will START or STAY ON the medications listed below when I get home from the hospital:    predniSONE 10 mg oral tablet  -- 2 tab(s) oral - by mouth once a day x 3 days  1 tab(s) oral - by mouth once a day x 3 days  -- It is very important that you take or use this exactly as directed.  Do not skip doses or discontinue unless directed by your doctor.  Obtain medical advice before taking any non-prescription drugs as some may affect the action of this medication.  Take with food or milk.    -- Indication: For Pneumonia    acetaminophen 325 mg oral tablet  -- 1 tab(s) by mouth every 4 hours, As needed, For Temp greater than 38 C (100.4 F)  -- Indication: For Pain or fever    ondansetron 4 mg oral tablet  -- 1 tab(s) by mouth every 8 hours, As Needed  -- Indication: For nausea    Combivent Respimat 20 mcg-100 mcg/inh inhalation aerosol  -- 1 puff(s) inhaled 4 times a day  -- Indication: For Penumonia    guaiFENesin 100 mg/5 mL oral liquid  -- 10 milliliter(s) by mouth every 6 hours  -- Indication: For cough    pantoprazole 40 mg oral delayed release tablet  -- 1 tab(s) by mouth once a day (before a meal)  -- Indication: For Stomach protectino with prednisone    levoFLOXacin 500 mg oral tablet  -- 1 tab(s) by mouth every 24 hours  -- Indication: For Pneumonia

## 2018-05-16 NOTE — DISCHARGE NOTE ADULT - CARE PLAN
Principal Discharge DX:	Pneumonia due to infectious organism, unspecified laterality, unspecified part of lung  Goal:	breathe better  Assessment and plan of treatment:	take medication as ordered  follow up with Dr Stewart and Dr Wall  take it easy for a few days  if you have any new problems or concerns call your doctor or return to the ED.  Due to the way the pneumonia looked on CT scan you will need a repeat CT in 6 weeks to ensure it all resolves and there is no mass hidden by the infection.

## 2018-05-16 NOTE — DISCHARGE NOTE ADULT - CARE PROVIDER_API CALL
Rodo Calix), Critical Care Medicine; Internal Medicine; Pulmonary Disease  [address] [place] [address]  Phone: [phone]  Fax: [phone]    Estrada Wall), [place] [address]  Phone: [phone]  Fax: [phone]

## 2018-05-16 NOTE — PROGRESS NOTE ADULT - PROBLEM SELECTOR PLAN 2
Follow up Blood Cultures, Sputum cultures   Urine legionella negative.   C/W IV Levofloxacin.   Pulmonary follow up from Dr rsoe appreciated.
Follow up Blood Cultures, Sputum cultures   Urine for legionella.   C/W IV Levofloxacin.   Pulmonary evaluation from Dr rose appreciated.
MION

## 2018-05-16 NOTE — PROGRESS NOTE ADULT - PROBLEM SELECTOR PLAN 1
improving leukocytosis, lactate no longer elevated  encourage oral fluid intake, DC IVF  continue to monitor.
Antibiotics  Awaiting culture
Antibiotics  Awaiting culture
improving leukocytosis, lactate no longer elevated  encourage oral fluid intake  continue to monitor.
po antibiotics

## 2018-05-16 NOTE — PROGRESS NOTE ADULT - PROBLEM SELECTOR PLAN 3
C/w Bronchodilators and prednisone  c/w  Robitussin  supportive care
C/w Bronchodilators and prednisone  add Robitussin  supportive care
Prednisone

## 2018-05-16 NOTE — DISCHARGE NOTE ADULT - HOSPITAL COURSE
69 y/o With PMHx of  Elevated liver enzymes, Fibroid uterus  Hyperlipidemia came in to Ed with productive cough (green sputum) and Shortness of breath. She was seen by urgent care and was started on Amoxicillin and hydrocodone for URI. However her breathing was more labored with wheezing. She also started having pleuritic chest pain.   In the ED she was found to have elevated lactate, leukocytosis and fever.   She was admitted for sepsis due to Pneumonia and acute bronchitis - likely bacterial superimposed on viral.   Her viral panel was positive for entero/rhinovirus.     < from: CT Chest No Cont (05.13.18 @ 09:30) >  Complete collapse of the right middle lobe on the current exam. No obvious mass at the right hilum. Right middle lobe bronchus is aerated.  No focal lung consolidation.  < end of copied text >    She was seen by Dr Calix from Pulmonary.  She was started on Levaquin, IV steroids, nebs and IV fluid hydration.  She improved very slowly.        She had some sinus tachycardia during admission.  She was asymptomatic and it improved with fluid.  It is likely a combination of some dehydration and the steroids.      Patient requested the pneumonia vaccine during admission.  After confirming that she never recieved it with Dr Wall she was given the Prevnar vaccine.  She will still require the pneumovax later this year.         PHYSICAL EXAM:  Vital Signs Last 24 Hrs  T(C): 36.7 (16 May 2018 14:35), Max: 36.9 (15 May 2018 21:28)  T(F): 98 (16 May 2018 14:35), Max: 98.4 (15 May 2018 21:28)  HR: 74 (16 May 2018 14:35) (64 - 109)  BP: 126/67 (16 May 2018 14:35) (108/63 - 143/72)  BP(mean): --  RR: 18 (16 May 2018 14:35) (16 - 18)  SpO2: 95% (16 May 2018 14:35) (92% - 98%)    GENERAL: NAD, well-groomed, well-developed  HEAD:  Atraumatic, Normocephalic  EYES: EOMI, PERRLA, conjunctiva and sclera clear  ENMT:  Moist mucous membranes  NECK: Supple, No JVD  NERVOUS SYSTEM:  Alert & Oriented X3, Good concentration; Motor Strength 5/5 B/L upper and lower extremities;   CHEST/LUNG: Clear to auscultation bilaterally; No rales, rhonchi, wheezing, or rubs  HEART: slightly tachycardic; No murmurs, rubs, or gallops  ABDOMEN: Soft, Nontender, Nondistended; Bowel sounds present  EXTREMITIES:  2+ Peripheral Pulses, No clubbing, cyanosis, or edema  LYMPH: No lymphadenopathy noted  SKIN: No rashes or lesions      Patient is medically stable for discharge.   Dr Wall was notified of admission and dc planning.     Time spent on day of discharge ~45 minutes.

## 2018-05-16 NOTE — PROGRESS NOTE ADULT - ASSESSMENT
69 y/o With PMHx of  Elevated liver enzymes, Fibroid uterus  Hyperlipidemia came in to Ed with cough and Shortness of breath and is admitted for Sepsis due to Pneumonia and acute bronchitis - likely bacterial superimposed on viral.
67 y/o With PMHx of  Elevated liver enzymes, Fibroid uterus  Hyperlipidemia came in to Ed with cough and Shortness of breath and is admitted for Sepsis due to Pneumonia and acute bronchitis - likely bacterial superimposed on viral.
Pt with acute pneumonia, bronchitis.  Mild sepsis with hi lactate, improved with fluids.   Atelectasis RML.  Rhinovirus with ?secondary bacterial infection.
Pt with acute pneumonia, bronchitis.  Mild sepsis with hi lactate, improved with fluids.   Atelectasis RML.  Rhinovirus with ?secondary bacterial infection.  Improved.
Pt with acute pneumonia, bronchitis.  Mild sepsis with hi lactate, improved with fluids.   Atelectasis RML.  Rhinovirus with ?secondary bacterial infection.  Improved.    May d./c pt to see me in office next week.   Would taper prednisone and give pt. Proair inhaler.

## 2018-05-16 NOTE — DISCHARGE NOTE ADULT - PLAN OF CARE
breathe better take medication as ordered  follow up with Dr Stewart and Dr Wall  take it easy for a few days  if you have any new problems or concerns call your doctor or return to the ED.  Due to the way the pneumonia looked on CT scan you will need a repeat CT in 6 weeks to ensure it all resolves and there is no mass hidden by the infection.

## 2018-05-16 NOTE — DISCHARGE NOTE ADULT - PATIENT PORTAL LINK FT
You can access the [facility] Patient Portal, offered by [facility], by registering with the following website: [email or web address]

## 2018-05-16 NOTE — PROGRESS NOTE ADULT - PROBLEM SELECTOR PROBLEM 6
Elevated liver enzymes
Prophylactic measure
Prophylactic measure

## 2018-05-16 NOTE — PROGRESS NOTE ADULT - SUBJECTIVE AND OBJECTIVE BOX
Patient is a 68y old  Female who presents with a chief complaint of Cough , Shortness of breath. (13 May 2018 10:31)      INTERVAL HPI/OVERNIGHT EVENTS: feeling a bit better, still SOB when moving around.  still coughing with lots of sputum.     MEDICATIONS  (STANDING):  ALBUTerol/ipratropium for Nebulization 3 milliLiter(s) Nebulizer every 6 hours  enoxaparin Injectable 40 milliGRAM(s) SubCutaneous daily  guaiFENesin    Syrup 200 milliGRAM(s) Oral every 6 hours  levoFLOXacin IVPB 500 milliGRAM(s) IV Intermittent every 24 hours  pantoprazole  Injectable 40 milliGRAM(s) IV Push daily  predniSONE   Tablet 40 milliGRAM(s) Oral daily  sodium chloride 0.9%. 1000 milliLiter(s) (100 mL/Hr) IV Continuous <Continuous>    MEDICATIONS  (PRN):  acetaminophen   Tablet 325 milliGRAM(s) Oral every 4 hours PRN For Temp greater than 38 C (100.4 F)  benzocaine 15 mG/menthol 3.6 mG Lozenge 1 Lozenge Oral every 3 hours PRN Sore Throat  ondansetron Injectable 4 milliGRAM(s) IV Push once PRN Nausea and/or Vomiting      Allergies  No Known Allergies    REVIEW OF SYSTEMS:  CONSTITUTIONAL: No fever, weight loss, or fatigue  EYES: No eye pain, visual disturbances, or discharge  ENMT:  No difficulty hearing, tinnitus, vertigo; No sinus or throat pain  NECK: No pain or stiffness  BREASTS: No pain, masses, or nipple discharge  RESPIRATORY: see hpi  CARDIOVASCULAR: No chest pain, palpitations, lightheadedness, or leg swelling  GASTROINTESTINAL: No abdominal or epigastric pain. No nausea, vomiting, or hematemesis; No diarrhea or constipation. No melena or hematochezia.  GENITOURINARY: No dysuria, frequency, hematuria, or incontinence  NEUROLOGICAL: No headaches, memory loss, vertigo, loss of strength, numbness, or tremors  SKIN: No itching, burning, rashes, or lesions   LYMPH NODES: No enlarged glands  ENDOCRINE: No heat or cold intolerance; No hair loss; No polydipsia or polyuria  MUSCULOSKELETAL: No joint pain or swelling; No muscle, back, or extremity pain  PSYCHIATRIC: No depression, anxiety, or mood swings  HEME/LYMPH: No easy bruising, or bleeding gums  ALLERGY AND IMMUNOLOGIC: No hives or eczema    Vital Signs Last 24 Hrs  T(C): 36.4 (15 May 2018 09:47), Max: 37.2 (15 May 2018 00:17)  T(F): 97.6 (15 May 2018 09:47), Max: 98.9 (15 May 2018 00:17)  HR: 106 (15 May 2018 09:47) (75 - 107)  BP: 134/74 (15 May 2018 09:47) (115/57 - 153/73)  BP(mean): --  RR: 18 (15 May 2018 09:47) (16 - 23)  SpO2: 91% (15 May 2018 09:47) (91% - 99%)    PHYSICAL EXAM:  GENERAL: NAD, well-groomed, well-developed  HEAD:  Atraumatic, Normocephalic  EYES: EOMI, PERRLA, conjunctiva and sclera clear  ENMT: Moist mucous membranes  NECK: Supple, No JVD  NERVOUS SYSTEM:  Alert & Oriented X3, Good concentration; All 4 extremities mobile, no gross sensory deficits.   CHEST/LUNG: right sided wheezing, rhonchi, worse at base. left side clear with a few rhonchi at bases.   HEART: Regular rate and rhythm;   ABDOMEN: Soft, Nontender, Nondistended; Bowel sounds present  EXTREMITIES:  2+ Peripheral Pulses, No clubbing, cyanosis, or edema  LYMPH: No lymphadenopathy noted  SKIN: No rashes or lesions    LABS:                        11.8   8.4   )-----------( 172      ( 15 May 2018 07:34 )             34.8       Ca    8.3        14 May 2018 05:55          CAPILLARY BLOOD GLUCOSE          RADIOLOGY & ADDITIONAL TESTS:    Imaging Personally Reviewed:  [ ] YES     Consultant(s) Notes Reviewed:  Pulm    Care Discussed with Consultants/Other Providers: Dr rose    Advanced Directives: [ ] DNR  [ ] No feeding tube  [ ] MOLST in chart  [ ] MOLST completed today  [ ] Unknown
PULMONARY/CRITICAL CARE      INTERVAL HPI/OVERNIGHT EVENTS:  Looks better. Still wheezing, coughing. No fever.    68y FemaleHPI:  69 y/o With PMHx of  Elevated liver enzymes, Fibroid uterus  Hyperlipidemia came in to Ed with cough and Shortness of breath. . Patient started having  cough since friday night.It worsened on saturday , developed  productive green sputum since Saturday morning. She was seen by urgent care and was started on Amoxicillin  and hydrocodone for URI. Last evening - saturday night her breathing was more labored with wheezing. She started having pleuritic  chest pain with cough,  chills, and nausea, no vomiting.  She also c/o throat pain and irritation . She denied in Chest pain , palpitation , dizziness or headache .   In ED Vital Signs Last 24 Hrs T(C): 36.9 (13 May 2018 07:34), Max: 37.4 (13 May 2018 05:59) T(F): 98.5 (13 May 2018 07:34), Max: 99.3 (13 May 2018 05:59) HR: 105 (13 May 2018 16:20) (99 - 118) BP: 130/54 (13 May 2018 16:20) (112/56 - 130/54) BP(mean): -- ABP: -- ABP(mean): -- RR: 22 (13 May 2018 10:12) (20 - 22) SpO2: 94% (13 May 2018 16:20) (87% - 100%)  She was started IV Levofloxacin , given 4 litres of IV NS , solumedrol and bronchodilators  .  Her lactate went up to 5.4.  She was evaluated by Pulmonary Dr Calix. (13 May 2018 10:31)        PAST MEDICAL & SURGICAL HISTORY:  Elevated liver enzymes: is being followed up by gasteroenterologist  Fibroid uterus  Hyperlipidemia  H/O arthroscopy of right knee: 5 years ago  H/O abdominal hysterectomy: 17 years ago        ICU Vital Signs Last 24 Hrs  T(C): 36.8 (15 May 2018 06:08), Max: 37.2 (15 May 2018 00:17)  T(F): 98.3 (15 May 2018 06:08), Max: 98.9 (15 May 2018 00:17)  HR: 75 (15 May 2018 06:08) (75 - 107)  BP: 115/57 (15 May 2018 06:08) (115/57 - 153/73)  BP(mean): --  ABP: --  ABP(mean): --  RR: 16 (15 May 2018 06:08) (16 - 23)  SpO2: 99% (15 May 2018 06:08) (93% - 99%)    Qtts:     I&O's Summary    14 May 2018 07:01  -  15 May 2018 07:00  --------------------------------------------------------  IN: 1600 mL / OUT: 0 mL / NET: 1600 mL          REVIEW OF SYSTEMS:    CONSTITUTIONAL: No fever, weight loss, or fatigue  EYES: No eye pain, visual disturbances, or discharge  ENMT:  No difficulty hearing, tinnitus, vertigo; No sinus or throat pain  NECK: No pain or stiffness  BREASTS: No pain, masses, or nipple discharge  RESPIRATORY: some cough, wheezing, no chills or hemoptysis; mild shortness of breath  CARDIOVASCULAR: No chest pain, palpitations, dizziness, or leg swelling  GASTROINTESTINAL: No abdominal or epigastric pain. No nausea, vomiting, or hematemesis; No diarrhea or constipation. No melena or hematochezia.  GENITOURINARY: No dysuria, frequency, hematuria, or incontinence  NEUROLOGICAL: No headaches, memory loss, loss of strength, numbness, or tremors  SKIN: No itching, burning, rashes, or lesions   LYMPH NODES: No enlarged glands  ENDOCRINE: No heat or cold intolerance; No hair loss  MUSCULOSKELETAL: No joint pain or swelling; No muscle, back, or extremity pain, no calf tenderness  PSYCHIATRIC: No depression, anxiety, mood swings, or difficulty sleeping  HEME/LYMPH: No easy bruising, or bleeding gums  ALLERGY AND IMMUNOLOGIC: No hives or eczema      PHYSICAL EXAM:    GENERAL: NAD, well-groomed, well-developed, NAD  HEAD:  Atraumatic, Normocephalic  EYES: EOMI, PERRLA, conjunctiva and sclera clear  ENMT: No tonsillar erythema, exudates, or enlargement; Moist mucous membranes, Good dentition, No lesions  NECK: Supple, No JVD, Normal thyroid  NERVOUS SYSTEM:  Alert & Oriented X3, Good concentration; Motor Strength 5/5 B/L upper and lower extremities  CHEST/LUNG: few bilat rhonchi, wheezing, No rubs  HEART: Regular rate and rhythm; No murmurs, rubs, or gallops  ABDOMEN: Soft, Nontender, Nondistended; Bowel sounds present  EXTREMITIES:  2+ Peripheral Pulses, No clubbing, cyanosis, or edema  LYMPH: No lymphadenopathy noted  SKIN: No rashes or lesions          LABS:                        12.0   11.2  )-----------( 165      ( 14 May 2018 05:55 )             33.9     05-14    144  |  112<H>  |  13  ----------------------------<  127<H>  3.7   |  25  |  0.70    Ca    8.3<L>      14 May 2018 05:55    TPro  5.7<L>  /  Alb  2.8<L>  /  TBili  0.3  /  DBili  x   /  AST  11  /  ALT  20  /  AlkPhos  47  05-14          vanco through     RADIOLOGY & ADDITIONAL STUDIES:< from: Xray Chest 1 View- PORTABLE-Routine (05.14.18 @ 05:57) >  EXAM:  XR CHEST PORTABLE ROUTINE 1V                                  PROCEDURE DATE:  05/14/2018          INTERPRETATION:      INDICATION: Shortness of breath pneumonia follow-up    Single frontal view of the chest compared to prior study of 5/13/2018    FINDINGS/  IMPRESSION:       There is elevation of the right hemidiaphragm with partial atelectasis   right lower lung field. Minimal fullness in the right hilum. The left   lung is clear. There is no pleural effusion. There is no pneumothorax.   The cardiac silhouette is within normal limits. There is tortuous   uncoiled aorta.  There is osteopenia and degenerative changes.                    WINNIE SAMPSON M.D., ATTENDING RADIOLOGIST  This document has been electronically signed. May14 2018  8:28AM              < end of copied text >        CRITICAL CARE TIME SPENT:
PULMONARY/CRITICAL CARE      INTERVAL HPI/OVERNIGHT EVENTS:  Pt. coughing. No fever. Less sob.   68y FemaleHPI:  67 y/o With PMHx of  Elevated liver enzymes, Fibroid uterus  Hyperlipidemia came in to Ed with cough and Shortness of breath. . Patient started having  cough since friday night.It worsened on saturday , developed  productive green sputum since Saturday morning. She was seen by urgent care and was started on Amoxicillin  and hydrocodone for URI. Last evening - saturday night her breathing was more labored with wheezing. She started having pleuritic  chest pain with cough,  chills, and nausea, no vomiting.  She also c/o throat pain and irritation . She denied in Chest pain , palpitation , dizziness or headache .   In ED Vital Signs Last 24 Hrs T(C): 36.9 (13 May 2018 07:34), Max: 37.4 (13 May 2018 05:59) T(F): 98.5 (13 May 2018 07:34), Max: 99.3 (13 May 2018 05:59) HR: 105 (13 May 2018 16:20) (99 - 118) BP: 130/54 (13 May 2018 16:20) (112/56 - 130/54) BP(mean): -- ABP: -- ABP(mean): -- RR: 22 (13 May 2018 10:12) (20 - 22) SpO2: 94% (13 May 2018 16:20) (87% - 100%)  She was started IV Levofloxacin , given 4 litres of IV NS , solumedrol and bronchodilators  .  Her lactate went up to 5.4.  She was evaluated by Pulmonary Dr Calix. (13 May 2018 10:31)        PAST MEDICAL & SURGICAL HISTORY:  Elevated liver enzymes: is being followed up by gasteroenterologist  Fibroid uterus  Hyperlipidemia  H/O arthroscopy of right knee: 5 years ago  H/O abdominal hysterectomy: 17 years ago        ICU Vital Signs Last 24 Hrs  T(C): 36.7 (14 May 2018 07:33), Max: 37.1 (13 May 2018 23:55)  T(F): 98 (14 May 2018 07:33), Max: 98.7 (13 May 2018 23:55)  HR: 81 (14 May 2018 07:46) (81 - 117)  BP: 119/62 (14 May 2018 07:33) (112/56 - 130/54)  BP(mean): --  ABP: --  ABP(mean): --  RR: 20 (14 May 2018 07:33) (18 - 22)  SpO2: 97% (14 May 2018 07:46) (93% - 98%)    Qtts:     I&O's Summary    13 May 2018 07:01  -  14 May 2018 07:00  --------------------------------------------------------  IN: 1000 mL / OUT: 300 mL / NET: 700 mL            REVIEW OF SYSTEMS:    CONSTITUTIONAL: No fever, weight loss, or fatigue  EYES: No eye pain, visual disturbances, or discharge  ENMT:  No difficulty hearing, tinnitus, vertigo; No sinus or throat pain  NECK: No pain or stiffness  BREASTS: No pain, masses, or nipple discharge  RESPIRATORY: some cough, wheezing, no chills or hemoptysis; mild shortness of breath  CARDIOVASCULAR: No chest pain, palpitations, dizziness, or leg swelling  GASTROINTESTINAL: No abdominal or epigastric pain. No nausea, vomiting, or hematemesis; No diarrhea or constipation. No melena or hematochezia.  GENITOURINARY: No dysuria, frequency, hematuria, or incontinence  NEUROLOGICAL: No headaches, memory loss, loss of strength, numbness, or tremors  SKIN: No itching, burning, rashes, or lesions   LYMPH NODES: No enlarged glands  ENDOCRINE: No heat or cold intolerance; No hair loss  MUSCULOSKELETAL: No joint pain or swelling; No muscle, back, or extremity pain, no calf tenderness  PSYCHIATRIC: No depression, anxiety, mood swings, or difficulty sleeping  HEME/LYMPH: No easy bruising, or bleeding gums  ALLERGY AND IMMUNOLOGIC: No hives or eczema      PHYSICAL EXAM:    GENERAL: NAD, well-groomed, well-developed, NAD  HEAD:  Atraumatic, Normocephalic  EYES: EOMI, PERRLA, conjunctiva and sclera clear  ENMT: No tonsillar erythema, exudates, or enlargement; Moist mucous membranes, Good dentition, No lesions  NECK: Supple, No JVD, Normal thyroid  NERVOUS SYSTEM:  Alert & Oriented X3, Good concentration; Motor Strength 5/5 B/L upper and lower extremities  CHEST/LUNG: few bilat rhonchi, wheezing, No rubs  HEART: Regular rate and rhythm; No murmurs, rubs, or gallops  ABDOMEN: Soft, Nontender, Nondistended; Bowel sounds present  EXTREMITIES:  2+ Peripheral Pulses, No clubbing, cyanosis, or edema  LYMPH: No lymphadenopathy noted  SKIN: No rashes or lesions        LABS:                        12.0   11.2  )-----------( 165      ( 14 May 2018 05:55 )             33.9     05-14    144  |  112<H>  |  13  ----------------------------<  127<H>  3.7   |  25  |  0.70    Ca    8.3<L>      14 May 2018 05:55    TPro  5.7<L>  /  Alb  2.8<L>  /  TBili  0.3  /  DBili  x   /  AST  11  /  ALT  20  /  AlkPhos  47  05-14          vanco through     RADIOLOGY & ADDITIONAL STUDIES:  < from: CT Chest No Cont (05.13.18 @ 09:30) >  EXAM:  CT CHEST                                  PROCEDURE DATE:  05/13/2018          INTERPRETATION:  CT CHEST DATED 5/13/2018.    CLINICAL INFORMATION:  Shortness of breath, evaluate for pneumonia..    TECHNIQUE:  Axial CT images through the chest are obtained from the   thoracic inlet through the upper abdomen without intravenous contrast.   Images are reformatted in sagittal and coronal planes.    The study is correlated with a prior exam from 11/19/2016.    FINDINGS:    There is an unchanged right posterior diaphragmatic hernia containing   fat. On the current exam, there is complete collapse of the right middle   lobe. There is no obvious mass lesion at the right hilum. The right   middle lobe bronchus is aerated. There is no focal lung consolidation or   mass. A 7.5 mm nodule in the left lower lobe (series 4:109) is without   significant interval change compared to the prior exam from 11/19/2016.   There are scattered bands of platelike atelectasis and dependent   atelectasis at the lung bases. The central airways are patent. There is   no pleural effusion.    The heart is not enlarged. There is no pericardial effusion. The thoracic   aorta and main pulmonary artery are normal in caliber.    There are scattered nonspecific subcentimeter mediastinal lymph nodes,   unchanged.    The thyroid gland is unremarkable in appearance.    The visualized portions of the upper abdomen are unremarkable apart from   a few unchanged subcentimeter low-attenuation lesions in the liver.    There are multilevel degenerative changes of the spine. A small lucency   within the posterior aspect of the T8 vertebral body is unchanged   compared to prior exam. There are hemangiomas in the L1 and L2 vertebral   bodies. 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    IMPRESSION:    Complete collapse of the right middle lobe on the current exam. No   obvious mass at the right hilum. Right middle lobe bronchus is aerated.    No focal lung consolidation.    Unchanged 7.5 mm nodule in the left lower lobecompared to prior exam   from 11/19/2016.                        MARY ANN ELKINS D.O.; ATTENDING RADIOLOGIST  This document has been electronically signed. May 13 2018  9:57AM              < end of copied text >      CRITICAL CARE TIME SPENT:
PULMONARY/CRITICAL CARE      INTERVAL HPI/OVERNIGHT EVENTS: Looks better, less cough. Still wheezing.    68y FemaleHPI:  67 y/o With PMHx of  Elevated liver enzymes, Fibroid uterus  Hyperlipidemia came in to Ed with cough and Shortness of breath. . Patient started having  cough since friday night.It worsened on saturday , developed  productive green sputum since Saturday morning. She was seen by urgent care and was started on Amoxicillin  and hydrocodone for URI. Last evening - saturday night her breathing was more labored with wheezing. She started having pleuritic  chest pain with cough,  chills, and nausea, no vomiting.  She also c/o throat pain and irritation . She denied in Chest pain , palpitation , dizziness or headache .   In ED Vital Signs Last 24 Hrs T(C): 36.9 (13 May 2018 07:34), Max: 37.4 (13 May 2018 05:59) T(F): 98.5 (13 May 2018 07:34), Max: 99.3 (13 May 2018 05:59) HR: 105 (13 May 2018 16:20) (99 - 118) BP: 130/54 (13 May 2018 16:20) (112/56 - 130/54) BP(mean): -- ABP: -- ABP(mean): -- RR: 22 (13 May 2018 10:12) (20 - 22) SpO2: 94% (13 May 2018 16:20) (87% - 100%)  She was started IV Levofloxacin , given 4 litres of IV NS , solumedrol and bronchodilators  .  Her lactate went up to 5.4.  She was evaluated by Pulmonary Dr Calix. (13 May 2018 10:31)        PAST MEDICAL & SURGICAL HISTORY:  Elevated liver enzymes: is being followed up by gasteroenterologist  Fibroid uterus  Hyperlipidemia  H/O arthroscopy of right knee: 5 years ago  H/O abdominal hysterectomy: 17 years ago        ICU Vital Signs Last 24 Hrs  T(C): 36.5 (16 May 2018 05:52), Max: 36.9 (15 May 2018 21:28)  T(F): 97.7 (16 May 2018 05:52), Max: 98.4 (15 May 2018 21:28)  HR: 73 (16 May 2018 05:52) (72 - 109)  BP: 108/63 (16 May 2018 05:52) (108/63 - 143/72)  BP(mean): --  ABP: --  ABP(mean): --  RR: 18 (16 May 2018 05:52) (16 - 18)  SpO2: 94% (16 May 2018 05:52) (91% - 98%)    Qtts:     I&O's Summary    15 May 2018 07:01  -  16 May 2018 07:00  --------------------------------------------------------  IN: 380 mL / OUT: 400 mL / NET: -20 mL            REVIEW OF SYSTEMS:    CONSTITUTIONAL: No fever, weight loss, or fatigue  EYES: No eye pain, visual disturbances, or discharge  ENMT:  No difficulty hearing, tinnitus, vertigo; No sinus or throat pain  NECK: No pain or stiffness  BREASTS: No pain, masses, or nipple discharge  RESPIRATORY: has  cough, wheezing, no chills or hemoptysis; mild shortness of breath  CARDIOVASCULAR: No chest pain, palpitations, dizziness, or leg swelling  GASTROINTESTINAL: No abdominal or epigastric pain. No nausea, vomiting, or hematemesis; No diarrhea or constipation. No melena or hematochezia.  GENITOURINARY: No dysuria, frequency, hematuria, or incontinence  NEUROLOGICAL: No headaches, memory loss, loss of strength, numbness, or tremors  SKIN: No itching, burning, rashes, or lesions   LYMPH NODES: No enlarged glands  ENDOCRINE: No heat or cold intolerance; No hair loss  MUSCULOSKELETAL: No joint pain or swelling; No muscle, back, or extremity pain, no calf tenderness  PSYCHIATRIC: No depression, anxiety, mood swings, or difficulty sleeping  HEME/LYMPH: No easy bruising, or bleeding gums  ALLERGY AND IMMUNOLOGIC: No hives or eczema      PHYSICAL EXAM:    GENERAL: NAD, well-groomed, well-developed, NAD  HEAD:  Atraumatic, Normocephalic  EYES: EOMI, PERRLA, conjunctiva and sclera clear  ENMT: No tonsillar erythema, exudates, or enlargement; Moist mucous membranes, Good dentition, No lesions  NECK: Supple, No JVD, Normal thyroid  NERVOUS SYSTEM:  Alert & Oriented X3, Good concentration; Motor Strength 5/5 B/L upper and lower extremities  CHEST/LUNG:  few bilat rhonchi, wheezing, good excursion  HEART: Regular rate and rhythm; No murmurs, rubs, or gallops  ABDOMEN: Soft, Nontender, Nondistended; Bowel sounds present  EXTREMITIES:  2+ Peripheral Pulses, No clubbing, cyanosis, or edema  LYMPH: No lymphadenopathy noted  SKIN: No rashes or lesions        LABS:                        11.8   8.4   )-----------( 172      ( 15 May 2018 07:34 )             34.8                 vanco through     RADIOLOGY & ADDITIONAL STUDIES:  < from: Xray Chest 2 Views PA/Lat (05.15.18 @ 08:51) >    EXAM:  XR CHEST PA LAT 2V                                  PROCEDURE DATE:  05/15/2018          INTERPRETATION:  Passes study dated 5/14/2018.    Clinical information: Atelectasis.    2 views, frontal and lateral    Disc atelectasis present, right perihilar region, right lung base,   minimal findings. The costophrenic angles are blunted. No sign of   vascular congestion or lobar consolidation. Heart size within normal   limits. Mediastinal contours intact. Osseous structures intact.    IMPRESSION: See above report                  RIANA CHARLES M.D.,ATTENDING RADIOLOGIST  This document has been electronically signed. May 15 2018  9:03AM        < end of copied text >      CRITICAL CARE TIME SPENT:
Patient is a 68y old  Female who presents with a chief complaint of Cough , Shortness of breath. (13 May 2018 10:31)      INTERVAL HPI/OVERNIGHT EVENTS: feeling better than yesterday but still with intense cough.  sputum color has changed from green to yellow.  no chest pain, no SOB.  has not been moving around since she is still in ED hold.   Did not sleep well last night due to noise.     MEDICATIONS  (STANDING):  ALBUTerol/ipratropium for Nebulization 3 milliLiter(s) Nebulizer every 6 hours  enoxaparin Injectable 40 milliGRAM(s) SubCutaneous daily  guaiFENesin    Syrup 200 milliGRAM(s) Oral every 6 hours  levoFLOXacin IVPB 500 milliGRAM(s) IV Intermittent every 24 hours  pantoprazole  Injectable 40 milliGRAM(s) IV Push daily  predniSONE   Tablet 40 milliGRAM(s) Oral daily  sodium chloride 0.9%. 1000 milliLiter(s) (100 mL/Hr) IV Continuous <Continuous>    MEDICATIONS  (PRN):  acetaminophen   Tablet 325 milliGRAM(s) Oral every 4 hours PRN For Temp greater than 38 C (100.4 F)  benzocaine 15 mG/menthol 3.6 mG Lozenge 1 Lozenge Oral every 3 hours PRN Sore Throat  ondansetron Injectable 4 milliGRAM(s) IV Push once PRN Nausea and/or Vomiting      Allergies  No Known Allergies    REVIEW OF SYSTEMS:  CONSTITUTIONAL: No fever, weight loss, or fatigue  EYES: No eye pain, visual disturbances, or discharge  ENMT:  No difficulty hearing, tinnitus, vertigo; No sinus or throat pain  NECK: No pain or stiffness  BREASTS: No pain, masses, or nipple discharge  RESPIRATORY: see hpi  CARDIOVASCULAR: No chest pain, palpitations, lightheadedness, or leg swelling  GASTROINTESTINAL: No abdominal or epigastric pain. No nausea, vomiting, or hematemesis; No diarrhea or constipation. No melena or hematochezia.  GENITOURINARY: No dysuria, frequency, hematuria, or incontinence  NEUROLOGICAL: No headaches, memory loss, vertigo, loss of strength, numbness, or tremors  SKIN: No itching, burning, rashes, or lesions   LYMPH NODES: No enlarged glands  ENDOCRINE: No heat or cold intolerance; No hair loss; No polydipsia or polyuria  MUSCULOSKELETAL: No joint pain or swelling; No muscle, back, or extremity pain  PSYCHIATRIC: No depression, anxiety, or mood swings  HEME/LYMPH: No easy bruising, or bleeding gums  ALLERGY AND IMMUNOLOGIC: No hives or eczema    Vital Signs Last 24 Hrs  T(C): 36.7 (14 May 2018 07:33), Max: 37.1 (13 May 2018 23:55)  T(F): 98 (14 May 2018 07:33), Max: 98.7 (13 May 2018 23:55)  HR: 78 (14 May 2018 13:34) (78 - 115)  BP: 119/62 (14 May 2018 07:33) (119/62 - 130/54)  BP(mean): --  RR: 20 (14 May 2018 07:33) (18 - 20)  SpO2: 97% (14 May 2018 07:46) (93% - 98%)    PHYSICAL EXAM:  GENERAL: NAD, well-groomed, well-developed  HEAD:  Atraumatic, Normocephalic  EYES: conjunctiva and sclera clear  ENMT: Moist mucous membranes,  NECK: Supple,   NERVOUS SYSTEM:  Alert & Oriented X3, Good concentration; All 4 extremities mobile, no gross sensory deficits.   CHEST/LUNG: right lower lung rales and rhochi, clear on left.   HEART: Regular rate and rhythm;   ABDOMEN: Soft, Nontender, Nondistended; Bowel sounds present  EXTREMITIES:  2+ Peripheral Pulses, No clubbing, cyanosis, or edema  LYMPH: No lymphadenopathy noted  SKIN: No rashes or lesions    LABS:                        12.0   11.2  )-----------( 165      ( 14 May 2018 05:55 )             33.9     14 May 2018 05:55    144    |  112    |  13     ----------------------------<  127    3.7     |  25     |  0.70     Ca    8.3        14 May 2018 05:55    TPro  5.7    /  Alb  2.8    /  TBili  0.3    /  DBili  x      /  AST  11     /  ALT  20     /  AlkPhos  47     14 May 2018 05:55        CAPILLARY BLOOD GLUCOSE          RADIOLOGY & ADDITIONAL TESTS:    Imaging Personally Reviewed:  [ ] YES     Consultant(s) Notes Reviewed:      Care Discussed with Consultants/Other Providers:    Advanced Directives: [ ] DNR  [ ] No feeding tube  [ ] MOLST in chart  [ ] MOLST completed today  [ ] Unknown

## 2018-05-18 LAB
CULTURE RESULTS: SIGNIFICANT CHANGE UP
CULTURE RESULTS: SIGNIFICANT CHANGE UP
SPECIMEN SOURCE: SIGNIFICANT CHANGE UP
SPECIMEN SOURCE: SIGNIFICANT CHANGE UP

## 2018-07-06 ENCOUNTER — APPOINTMENT (OUTPATIENT)
Dept: CT IMAGING | Facility: CLINIC | Age: 68
End: 2018-07-06

## 2018-07-20 ENCOUNTER — OUTPATIENT (OUTPATIENT)
Dept: OUTPATIENT SERVICES | Facility: HOSPITAL | Age: 68
LOS: 1 days | End: 2018-07-20
Payer: COMMERCIAL

## 2018-07-20 ENCOUNTER — APPOINTMENT (OUTPATIENT)
Dept: NUCLEAR MEDICINE | Facility: CLINIC | Age: 68
End: 2018-07-20
Payer: COMMERCIAL

## 2018-07-20 DIAGNOSIS — Z98.890 OTHER SPECIFIED POSTPROCEDURAL STATES: Chronic | ICD-10-CM

## 2018-07-20 DIAGNOSIS — Z90.710 ACQUIRED ABSENCE OF BOTH CERVIX AND UTERUS: Chronic | ICD-10-CM

## 2018-07-20 DIAGNOSIS — Z00.8 ENCOUNTER FOR OTHER GENERAL EXAMINATION: ICD-10-CM

## 2018-07-20 PROCEDURE — A9552: CPT

## 2018-07-20 PROCEDURE — 78815 PET IMAGE W/CT SKULL-THIGH: CPT | Mod: 26,PI

## 2018-07-20 PROCEDURE — 78815 PET IMAGE W/CT SKULL-THIGH: CPT

## 2018-08-03 ENCOUNTER — OUTPATIENT (OUTPATIENT)
Dept: OUTPATIENT SERVICES | Facility: HOSPITAL | Age: 68
LOS: 1 days | End: 2018-08-03
Payer: COMMERCIAL

## 2018-08-03 ENCOUNTER — APPOINTMENT (OUTPATIENT)
Dept: ULTRASOUND IMAGING | Facility: CLINIC | Age: 68
End: 2018-08-03
Payer: COMMERCIAL

## 2018-08-03 DIAGNOSIS — Z90.710 ACQUIRED ABSENCE OF BOTH CERVIX AND UTERUS: Chronic | ICD-10-CM

## 2018-08-03 DIAGNOSIS — Z00.8 ENCOUNTER FOR OTHER GENERAL EXAMINATION: ICD-10-CM

## 2018-08-03 DIAGNOSIS — Z98.890 OTHER SPECIFIED POSTPROCEDURAL STATES: Chronic | ICD-10-CM

## 2018-08-03 PROCEDURE — 76642 ULTRASOUND BREAST LIMITED: CPT | Mod: 26,50

## 2018-08-03 PROCEDURE — 76642 ULTRASOUND BREAST LIMITED: CPT

## 2018-09-16 ENCOUNTER — EMERGENCY (EMERGENCY)
Facility: HOSPITAL | Age: 68
LOS: 1 days | Discharge: HOME | End: 2018-09-16
Attending: EMERGENCY MEDICINE | Admitting: EMERGENCY MEDICINE
Payer: COMMERCIAL

## 2018-09-16 VITALS
WEIGHT: 132.06 LBS | SYSTOLIC BLOOD PRESSURE: 130 MMHG | HEIGHT: 62 IN | DIASTOLIC BLOOD PRESSURE: 70 MMHG | HEART RATE: 110 BPM | RESPIRATION RATE: 18 BRPM | OXYGEN SATURATION: 93 % | TEMPERATURE: 100 F

## 2018-09-16 VITALS
HEART RATE: 94 BPM | OXYGEN SATURATION: 95 % | SYSTOLIC BLOOD PRESSURE: 115 MMHG | DIASTOLIC BLOOD PRESSURE: 60 MMHG | TEMPERATURE: 98 F | RESPIRATION RATE: 18 BRPM

## 2018-09-16 DIAGNOSIS — Z98.890 OTHER SPECIFIED POSTPROCEDURAL STATES: Chronic | ICD-10-CM

## 2018-09-16 DIAGNOSIS — Z90.710 ACQUIRED ABSENCE OF BOTH CERVIX AND UTERUS: Chronic | ICD-10-CM

## 2018-09-16 LAB
ALBUMIN SERPL ELPH-MCNC: 3.9 G/DL — SIGNIFICANT CHANGE UP (ref 3.3–5)
ALP SERPL-CCNC: 76 U/L — SIGNIFICANT CHANGE UP (ref 30–120)
ALT FLD-CCNC: 26 U/L DA — SIGNIFICANT CHANGE UP (ref 10–60)
ANION GAP SERPL CALC-SCNC: 9 MMOL/L — SIGNIFICANT CHANGE UP (ref 5–17)
APPEARANCE UR: CLEAR — SIGNIFICANT CHANGE UP
APTT BLD: 29.7 SEC — SIGNIFICANT CHANGE UP (ref 27.5–37.4)
AST SERPL-CCNC: 20 U/L — SIGNIFICANT CHANGE UP (ref 10–40)
BASOPHILS # BLD AUTO: 0.02 K/UL — SIGNIFICANT CHANGE UP (ref 0–0.2)
BASOPHILS NFR BLD AUTO: 0.3 % — SIGNIFICANT CHANGE UP (ref 0–2)
BILIRUB SERPL-MCNC: 0.4 MG/DL — SIGNIFICANT CHANGE UP (ref 0.2–1.2)
BILIRUB UR-MCNC: NEGATIVE — SIGNIFICANT CHANGE UP
BUN SERPL-MCNC: 24 MG/DL — HIGH (ref 7–23)
CALCIUM SERPL-MCNC: 8.9 MG/DL — SIGNIFICANT CHANGE UP (ref 8.4–10.5)
CHLORIDE SERPL-SCNC: 106 MMOL/L — SIGNIFICANT CHANGE UP (ref 96–108)
CO2 SERPL-SCNC: 25 MMOL/L — SIGNIFICANT CHANGE UP (ref 22–31)
COLOR SPEC: YELLOW — SIGNIFICANT CHANGE UP
CREAT SERPL-MCNC: 0.9 MG/DL — SIGNIFICANT CHANGE UP (ref 0.5–1.3)
DIFF PNL FLD: NEGATIVE — SIGNIFICANT CHANGE UP
EOSINOPHIL # BLD AUTO: 0.09 K/UL — SIGNIFICANT CHANGE UP (ref 0–0.5)
EOSINOPHIL NFR BLD AUTO: 1.4 % — SIGNIFICANT CHANGE UP (ref 0–6)
GLUCOSE SERPL-MCNC: 137 MG/DL — HIGH (ref 70–99)
GLUCOSE UR QL: NEGATIVE MG/DL — SIGNIFICANT CHANGE UP
HCT VFR BLD CALC: 42.9 % — SIGNIFICANT CHANGE UP (ref 34.5–45)
HGB BLD-MCNC: 14.1 G/DL — SIGNIFICANT CHANGE UP (ref 11.5–15.5)
IMM GRANULOCYTES NFR BLD AUTO: 0.8 % — SIGNIFICANT CHANGE UP (ref 0–1.5)
INR BLD: 1.04 RATIO — SIGNIFICANT CHANGE UP (ref 0.88–1.16)
KETONES UR-MCNC: NEGATIVE — SIGNIFICANT CHANGE UP
LACTATE SERPL-SCNC: 1.1 MMOL/L — SIGNIFICANT CHANGE UP (ref 0.7–2)
LEUKOCYTE ESTERASE UR-ACNC: ABNORMAL
LYMPHOCYTES # BLD AUTO: 0.33 K/UL — LOW (ref 1–3.3)
LYMPHOCYTES # BLD AUTO: 5 % — LOW (ref 13–44)
MCHC RBC-ENTMCNC: 28.8 PG — SIGNIFICANT CHANGE UP (ref 27–34)
MCHC RBC-ENTMCNC: 32.9 GM/DL — SIGNIFICANT CHANGE UP (ref 32–36)
MCV RBC AUTO: 87.7 FL — SIGNIFICANT CHANGE UP (ref 80–100)
MONOCYTES # BLD AUTO: 0.3 K/UL — SIGNIFICANT CHANGE UP (ref 0–0.9)
MONOCYTES NFR BLD AUTO: 4.5 % — SIGNIFICANT CHANGE UP (ref 2–14)
NEUTROPHILS # BLD AUTO: 5.82 K/UL — SIGNIFICANT CHANGE UP (ref 1.8–7.4)
NEUTROPHILS NFR BLD AUTO: 88 % — HIGH (ref 43–77)
NITRITE UR-MCNC: NEGATIVE — SIGNIFICANT CHANGE UP
NRBC # BLD: 0 /100 WBCS — SIGNIFICANT CHANGE UP (ref 0–0)
PH UR: 6.5 — SIGNIFICANT CHANGE UP (ref 5–8)
PLATELET # BLD AUTO: 168 K/UL — SIGNIFICANT CHANGE UP (ref 150–400)
POTASSIUM SERPL-MCNC: 3.8 MMOL/L — SIGNIFICANT CHANGE UP (ref 3.5–5.3)
POTASSIUM SERPL-SCNC: 3.8 MMOL/L — SIGNIFICANT CHANGE UP (ref 3.5–5.3)
PROT SERPL-MCNC: 7 G/DL — SIGNIFICANT CHANGE UP (ref 6–8.3)
PROT UR-MCNC: NEGATIVE MG/DL — SIGNIFICANT CHANGE UP
PROTHROM AB SERPL-ACNC: 11.3 SEC — SIGNIFICANT CHANGE UP (ref 9.8–12.7)
RAPID RVP RESULT: SIGNIFICANT CHANGE UP
RBC # BLD: 4.89 M/UL — SIGNIFICANT CHANGE UP (ref 3.8–5.2)
RBC # FLD: 11.8 % — SIGNIFICANT CHANGE UP (ref 10.3–14.5)
SODIUM SERPL-SCNC: 140 MMOL/L — SIGNIFICANT CHANGE UP (ref 135–145)
SP GR SPEC: 1.01 — SIGNIFICANT CHANGE UP (ref 1.01–1.02)
UROBILINOGEN FLD QL: NEGATIVE MG/DL — SIGNIFICANT CHANGE UP
WBC # BLD: 6.61 K/UL — SIGNIFICANT CHANGE UP (ref 3.8–10.5)
WBC # FLD AUTO: 6.61 K/UL — SIGNIFICANT CHANGE UP (ref 3.8–10.5)
WBC UR QL: SIGNIFICANT CHANGE UP

## 2018-09-16 PROCEDURE — 87086 URINE CULTURE/COLONY COUNT: CPT

## 2018-09-16 PROCEDURE — 71046 X-RAY EXAM CHEST 2 VIEWS: CPT

## 2018-09-16 PROCEDURE — 36415 COLL VENOUS BLD VENIPUNCTURE: CPT

## 2018-09-16 PROCEDURE — 85730 THROMBOPLASTIN TIME PARTIAL: CPT

## 2018-09-16 PROCEDURE — 87633 RESP VIRUS 12-25 TARGETS: CPT

## 2018-09-16 PROCEDURE — 80053 COMPREHEN METABOLIC PANEL: CPT

## 2018-09-16 PROCEDURE — 99283 EMERGENCY DEPT VISIT LOW MDM: CPT

## 2018-09-16 PROCEDURE — 85610 PROTHROMBIN TIME: CPT

## 2018-09-16 PROCEDURE — 87040 BLOOD CULTURE FOR BACTERIA: CPT

## 2018-09-16 PROCEDURE — 96360 HYDRATION IV INFUSION INIT: CPT

## 2018-09-16 PROCEDURE — 83605 ASSAY OF LACTIC ACID: CPT

## 2018-09-16 PROCEDURE — 87581 M.PNEUMON DNA AMP PROBE: CPT

## 2018-09-16 PROCEDURE — 85027 COMPLETE CBC AUTOMATED: CPT

## 2018-09-16 PROCEDURE — 87798 DETECT AGENT NOS DNA AMP: CPT

## 2018-09-16 PROCEDURE — 87486 CHLMYD PNEUM DNA AMP PROBE: CPT

## 2018-09-16 PROCEDURE — 81001 URINALYSIS AUTO W/SCOPE: CPT

## 2018-09-16 PROCEDURE — 99283 EMERGENCY DEPT VISIT LOW MDM: CPT | Mod: 25

## 2018-09-16 PROCEDURE — 71046 X-RAY EXAM CHEST 2 VIEWS: CPT | Mod: 26

## 2018-09-16 RX ORDER — IBUPROFEN 200 MG
600 TABLET ORAL ONCE
Qty: 0 | Refills: 0 | Status: COMPLETED | OUTPATIENT
Start: 2018-09-16 | End: 2018-09-16

## 2018-09-16 RX ORDER — SODIUM CHLORIDE 9 MG/ML
1000 INJECTION INTRAMUSCULAR; INTRAVENOUS; SUBCUTANEOUS
Qty: 0 | Refills: 0 | Status: COMPLETED | OUTPATIENT
Start: 2018-09-16 | End: 2018-09-16

## 2018-09-16 RX ADMIN — SODIUM CHLORIDE 1000 MILLILITER(S): 9 INJECTION INTRAMUSCULAR; INTRAVENOUS; SUBCUTANEOUS at 02:13

## 2018-09-16 RX ADMIN — SODIUM CHLORIDE 1000 MILLILITER(S): 9 INJECTION INTRAMUSCULAR; INTRAVENOUS; SUBCUTANEOUS at 01:13

## 2018-09-16 RX ADMIN — Medication 600 MILLIGRAM(S): at 01:17

## 2018-09-16 RX ADMIN — Medication 600 MILLIGRAM(S): at 01:58

## 2018-09-16 NOTE — ED ADULT NURSE NOTE - NSIMPLEMENTINTERV_GEN_ALL_ED
Implemented All Fall Risk Interventions:  [place] to call system. Call bell, personal items and telephone within reach. Instruct patient to call for assistance. Room bathroom lighting operational. Non-slip footwear when patient is off stretcher. Physically safe environment: no spills, clutter or unnecessary equipment. Stretcher in lowest position, wheels locked, appropriate side rails in place. Provide visual cue, wrist band, yellow gown, etc. Monitor gait and stability. Monitor for mental status changes and reorient to person, place, and time. Review medications for side effects contributing to fall risk. Reinforce activity limits and safety measures with patient and family.

## 2018-09-16 NOTE — ED PROVIDER NOTE - CHPI ED SYMPTOMS NEG
no decreased eating/drinking/no rash/no shortness of breath/no abdominal pain/no cough/no headache/no diarrhea/no vomiting

## 2018-09-16 NOTE — ED PROVIDER NOTE - OBJECTIVE STATEMENT
Patient had a flushot yesterday evening. About 5 hours later developed a fever and chills. No cough, no SOB. No URI symptoms. No n/v/d/c. No urinary c/o. No travel/sick contacts

## 2018-09-17 LAB
CULTURE RESULTS: NO GROWTH — SIGNIFICANT CHANGE UP
SPECIMEN SOURCE: SIGNIFICANT CHANGE UP

## 2018-10-23 ENCOUNTER — APPOINTMENT (OUTPATIENT)
Dept: SURGERY | Facility: CLINIC | Age: 68
End: 2018-10-23
Payer: COMMERCIAL

## 2018-10-23 PROCEDURE — 99213 OFFICE O/P EST LOW 20 MIN: CPT

## 2018-12-06 ENCOUNTER — APPOINTMENT (OUTPATIENT)
Dept: SURGERY | Facility: CLINIC | Age: 68
End: 2018-12-06
Payer: COMMERCIAL

## 2018-12-06 PROCEDURE — 40808 BIOPSY OF MOUTH LESION: CPT

## 2018-12-06 PROCEDURE — 99213 OFFICE O/P EST LOW 20 MIN: CPT | Mod: 25

## 2018-12-11 ENCOUNTER — OTHER (OUTPATIENT)
Age: 68
End: 2018-12-11

## 2019-01-29 NOTE — PATIENT PROFILE ADULT. - COMFORT LEVEL, ACCEPTABLE
NAVIGSATINDER Clinician Contact & Progress Note  For Individual Resiliency Training (IRT)  A Part of the [facility] First Episode of Psychosis Program    NAVIGATE Enrollee: Janey Givens (1996)     MRN: [id number]  Date:  1/29/19  Diagnosis: Schizophrenia (F20.9)  Clinician: CARMEN Individual Resiliency Trainer, VERENICE See     1. Type of contact: (majority of time spent)  IRT Session    2. People present:   Writer  Client: Janey Givens    3. Total number of persons who participated in contact: 2, including writer    4. Length of Actual Contact: Start Time: 9:00; End Time: 10:00   Traveled?    No     5. Location of contact:  [facility], [place]    6. Did the client complete the home practice option(s) from the previous session: Completed    7. Motivational Teaching Strategies:  Connect info and skills with personal goals  Promote hope and positive expectations  Explore pros and cons of change  Re-frame experiences in positive light    8. Educational Teaching Strategies:  Review of written material/education  Relate information to client's experience  Ask questions to check comprehension  Adopt client's language     9. CBT Teaching Strategies:  Reinforcement and shaping (positive feedback for steps towards goals and gains in knowledge & skills)  Relapse prevention planning (review of stressors and early warning signs)  Coping skills training (review current coping skills, increase currently used skills and plan home practice)    10. IRT Module(s) Addressed:  Module 3 - Education about Psychosis  Module 9 - Coping with Symptoms    11. Techniques utilized:   [place] announced at beginning of session  Review of homework  Review of previous meeting  Present new material  Problem-solving practice  Help client choose a home practice option  Summarize progress made in current session    12. Mental Status Exam:    Alertness: oriented and sleepy  Appearance: casually groomed  Behavior/Demeanor:  cooperative, pleasant and calm, with good  eye contact   Speech: normal and regular rate and rhythm  Language: intact. Preferred language identified as English.  Psychomotor: normal or unremarkable  Mood: depressed  Affect: appropriate; was congruent to mood; was congruent to content  Thought Process/Associations: unremarkable  Thought Content:  Reports delusions and preoccupations;  Denies suicidal and violent ideation  Perception:  Reports auditory hallucinations;  Denies visual hallucinations  Insight: adequate  Judgment: fair  Cognition: does  appear grossly intact; formal cognitive testing was not done  Suicidal ideation: denies SI, denies intent,  and denies plan  Homicidal Ideation: denies    13. Assessment/Progress Note:     This writer met with Katie for a follow-up IRT session. Katie reported thought broadcasting symptoms throughout the week, but she is able to reality test them consistently. She experiences auditory hallucinations 3 times per day currently and reported the voices as negative in nature. She mentioned preoccupation with thoughts of her roommates not liking her. She noted they had an argument that was unresolved and she has been uncomfortable since. This writer validated her feelings and helped Katie to process through feelings of loss and confusion. She does not feel her symptoms have interfered with homework or focusing during class at this time. She is regularly taking her antipsychotic medication, but has forgotten her antidepressant a few nights this past week. Katie reported daily marijuana use right before sleeping. She stated she plans for the day ahead and takes all medications prior to using marijuana. She does not feel cannabis has impacted her symptoms or daily schedule. This writer then provided and reviewed strategies for preventing/coping with stress. Katie mentioned her classes have been rigorous this semester so far and she is concerned about her sustainability. She is agreeable to a  "phone call with Ame (SEE) about accommodations. However, she does not believe accommodations will be of much help. Katie has been intentional about scheduling meaningful activities outside of schoolwork each week. She would like to increase exercise, but is unsure where to fit this in her current schedule. This writer offered to help make an organized weekly routine/schedule during the next visit with Katie. This writer provided the rest of the coping with stress portion of the module for Katie to review at home. She would like to practice utilizing her sense of humor as a stress reliever and break from homework throughout the week. Due to Katie's busy schedule, she would like to move IRT sessions to once every 3 weeks. She agreed to call this writer if noticing any changes in symptoms.     14. Plan/Referrals:     This writer will coordinate with Ame to set up a phone call with Katie.    This writer will continue to provide assessment of symptoms, as well as coping strategies for preventing stress.     Billing for \"Interactive Complexity\"?    No    Attestation:    I did not see this patient directly. This patient is discussed with me in individual clinical social work supervision, and I agree with the plan as documented.     MARCELL Wong, [facility], February 6, 2019          VERENICE See    NAVIGATE Individual Resiliency Trainer  " 1

## 2019-03-21 ENCOUNTER — APPOINTMENT (OUTPATIENT)
Dept: SURGERY | Facility: CLINIC | Age: 69
End: 2019-03-21
Payer: COMMERCIAL

## 2019-03-21 PROCEDURE — 99213 OFFICE O/P EST LOW 20 MIN: CPT

## 2019-03-21 NOTE — PHYSICAL EXAM
[de-identified] : 1 cm right neck intradermal mass, well circumscribed [de-identified] : no palpable thyroid nodules [Laryngoscopy Performed] : laryngoscopy was performed, see procedure section for findings [Midline] : located in midline position [de-identified] : left lower lip mucosal scar with no evidence of recurrent disease. no new lesions noted [Normal] : orientation to person, place, and time: normal

## 2019-03-21 NOTE — ASSESSMENT
[FreeTextEntry1] : to apply warm compresses to neck lesion. if persists will consider excisional biopsy. to return earlier if any change

## 2019-03-21 NOTE — HISTORY OF PRESENT ILLNESS
[de-identified] : 25  months   s/p resection lower lip cancer. denies  bleeding, dysphagia, hoarseness. no changes medically since last visit.   2 week history of right neck lesion. denies pain, drainage or infection

## 2019-04-01 ENCOUNTER — INPATIENT (INPATIENT)
Facility: HOSPITAL | Age: 69
LOS: 1 days | Discharge: HOME | DRG: 202 | End: 2019-04-03
Attending: INTERNAL MEDICINE | Admitting: INTERNAL MEDICINE
Payer: MEDICARE

## 2019-04-01 VITALS
HEART RATE: 108 BPM | SYSTOLIC BLOOD PRESSURE: 133 MMHG | HEIGHT: 62 IN | TEMPERATURE: 98 F | OXYGEN SATURATION: 90 % | WEIGHT: 136.03 LBS | DIASTOLIC BLOOD PRESSURE: 73 MMHG | RESPIRATION RATE: 25 BRPM

## 2019-04-01 DIAGNOSIS — J20.9 ACUTE BRONCHITIS, UNSPECIFIED: ICD-10-CM

## 2019-04-01 DIAGNOSIS — E78.5 HYPERLIPIDEMIA, UNSPECIFIED: ICD-10-CM

## 2019-04-01 DIAGNOSIS — I21.4 NON-ST ELEVATION (NSTEMI) MYOCARDIAL INFARCTION: ICD-10-CM

## 2019-04-01 DIAGNOSIS — Z98.890 OTHER SPECIFIED POSTPROCEDURAL STATES: Chronic | ICD-10-CM

## 2019-04-01 DIAGNOSIS — R74.8 ABNORMAL LEVELS OF OTHER SERUM ENZYMES: ICD-10-CM

## 2019-04-01 DIAGNOSIS — J18.9 PNEUMONIA, UNSPECIFIED ORGANISM: ICD-10-CM

## 2019-04-01 DIAGNOSIS — Z90.710 ACQUIRED ABSENCE OF BOTH CERVIX AND UTERUS: Chronic | ICD-10-CM

## 2019-04-01 DIAGNOSIS — R07.9 CHEST PAIN, UNSPECIFIED: ICD-10-CM

## 2019-04-01 DIAGNOSIS — Z29.9 ENCOUNTER FOR PROPHYLACTIC MEASURES, UNSPECIFIED: ICD-10-CM

## 2019-04-01 LAB
ALBUMIN SERPL ELPH-MCNC: 3.9 G/DL — SIGNIFICANT CHANGE UP (ref 3.3–5)
ALP SERPL-CCNC: 74 U/L — SIGNIFICANT CHANGE UP (ref 30–120)
ALT FLD-CCNC: 24 U/L DA — SIGNIFICANT CHANGE UP (ref 10–60)
ANION GAP SERPL CALC-SCNC: 14 MMOL/L — SIGNIFICANT CHANGE UP (ref 5–17)
APPEARANCE UR: ABNORMAL
APTT BLD: 25.7 SEC — LOW (ref 28.5–37)
AST SERPL-CCNC: 19 U/L — SIGNIFICANT CHANGE UP (ref 10–40)
BACTERIA # UR AUTO: ABNORMAL
BASE EXCESS BLDA CALC-SCNC: -2.6 MMOL/L — LOW (ref -2–2)
BASOPHILS # BLD AUTO: 0.02 K/UL — SIGNIFICANT CHANGE UP (ref 0–0.2)
BASOPHILS NFR BLD AUTO: 0.2 % — SIGNIFICANT CHANGE UP (ref 0–2)
BILIRUB SERPL-MCNC: 0.6 MG/DL — SIGNIFICANT CHANGE UP (ref 0.2–1.2)
BILIRUB UR-MCNC: NEGATIVE — SIGNIFICANT CHANGE UP
BLOOD GAS COMMENTS: SIGNIFICANT CHANGE UP
BLOOD GAS COMMENTS: SIGNIFICANT CHANGE UP
BLOOD GAS SOURCE: SIGNIFICANT CHANGE UP
BUN SERPL-MCNC: 14 MG/DL — SIGNIFICANT CHANGE UP (ref 7–23)
CALCIUM SERPL-MCNC: 8.9 MG/DL — SIGNIFICANT CHANGE UP (ref 8.4–10.5)
CHLORIDE SERPL-SCNC: 106 MMOL/L — SIGNIFICANT CHANGE UP (ref 96–108)
CK SERPL-CCNC: 134 U/L — SIGNIFICANT CHANGE UP (ref 26–192)
CK SERPL-CCNC: 159 U/L — SIGNIFICANT CHANGE UP (ref 26–192)
CO2 SERPL-SCNC: 24 MMOL/L — SIGNIFICANT CHANGE UP (ref 22–31)
COLOR SPEC: YELLOW — SIGNIFICANT CHANGE UP
CREAT SERPL-MCNC: 0.71 MG/DL — SIGNIFICANT CHANGE UP (ref 0.5–1.3)
D DIMER BLD IA.RAPID-MCNC: 192 NG/ML DDU — SIGNIFICANT CHANGE UP
DIFF PNL FLD: ABNORMAL
EOSINOPHIL # BLD AUTO: 0.26 K/UL — SIGNIFICANT CHANGE UP (ref 0–0.5)
EOSINOPHIL NFR BLD AUTO: 2.9 % — SIGNIFICANT CHANGE UP (ref 0–6)
EPI CELLS # UR: ABNORMAL
FLU A RESULT: SIGNIFICANT CHANGE UP
FLU A RESULT: SIGNIFICANT CHANGE UP
FLUAV AG NPH QL: SIGNIFICANT CHANGE UP
FLUBV AG NPH QL: SIGNIFICANT CHANGE UP
GLUCOSE SERPL-MCNC: 165 MG/DL — HIGH (ref 70–99)
GLUCOSE UR QL: NEGATIVE MG/DL — SIGNIFICANT CHANGE UP
HCO3 BLDA-SCNC: 22 MMOL/L — SIGNIFICANT CHANGE UP (ref 21–29)
HCT VFR BLD CALC: 43.6 % — SIGNIFICANT CHANGE UP (ref 34.5–45)
HGB BLD-MCNC: 14.6 G/DL — SIGNIFICANT CHANGE UP (ref 11.5–15.5)
HOROWITZ INDEX BLDA+IHG-RTO: 36 — SIGNIFICANT CHANGE UP
IMM GRANULOCYTES NFR BLD AUTO: 0.3 % — SIGNIFICANT CHANGE UP (ref 0–1.5)
INR BLD: 1.07 RATIO — SIGNIFICANT CHANGE UP (ref 0.88–1.16)
KETONES UR-MCNC: NEGATIVE — SIGNIFICANT CHANGE UP
LACTATE SERPL-SCNC: 1.9 MMOL/L — SIGNIFICANT CHANGE UP (ref 0.7–2)
LEUKOCYTE ESTERASE UR-ACNC: ABNORMAL
LIDOCAIN IGE QN: 103 U/L — SIGNIFICANT CHANGE UP (ref 73–393)
LIDOCAIN IGE QN: 103 U/L — SIGNIFICANT CHANGE UP (ref 73–393)
LYMPHOCYTES # BLD AUTO: 1.35 K/UL — SIGNIFICANT CHANGE UP (ref 1–3.3)
LYMPHOCYTES # BLD AUTO: 15.3 % — SIGNIFICANT CHANGE UP (ref 13–44)
MAGNESIUM SERPL-MCNC: 1.8 MG/DL — SIGNIFICANT CHANGE UP (ref 1.6–2.6)
MCHC RBC-ENTMCNC: 29.7 PG — SIGNIFICANT CHANGE UP (ref 27–34)
MCHC RBC-ENTMCNC: 33.5 GM/DL — SIGNIFICANT CHANGE UP (ref 32–36)
MCV RBC AUTO: 88.8 FL — SIGNIFICANT CHANGE UP (ref 80–100)
MONOCYTES # BLD AUTO: 0.47 K/UL — SIGNIFICANT CHANGE UP (ref 0–0.9)
MONOCYTES NFR BLD AUTO: 5.3 % — SIGNIFICANT CHANGE UP (ref 2–14)
NEUTROPHILS # BLD AUTO: 6.71 K/UL — SIGNIFICANT CHANGE UP (ref 1.8–7.4)
NEUTROPHILS NFR BLD AUTO: 76 % — SIGNIFICANT CHANGE UP (ref 43–77)
NITRITE UR-MCNC: NEGATIVE — SIGNIFICANT CHANGE UP
NRBC # BLD: 0 /100 WBCS — SIGNIFICANT CHANGE UP (ref 0–0)
PCO2 BLDA: 39 MMHG — SIGNIFICANT CHANGE UP (ref 32–46)
PH BLD: 7.37 — SIGNIFICANT CHANGE UP (ref 7.35–7.45)
PH UR: 5 — SIGNIFICANT CHANGE UP (ref 5–8)
PLATELET # BLD AUTO: 187 K/UL — SIGNIFICANT CHANGE UP (ref 150–400)
PO2 BLDA: 65 MMHG — LOW (ref 74–108)
POTASSIUM SERPL-MCNC: 3.9 MMOL/L — SIGNIFICANT CHANGE UP (ref 3.5–5.3)
POTASSIUM SERPL-SCNC: 3.9 MMOL/L — SIGNIFICANT CHANGE UP (ref 3.5–5.3)
PROCALCITONIN SERPL-MCNC: 0.07 NG/ML — SIGNIFICANT CHANGE UP (ref 0.02–0.1)
PROT SERPL-MCNC: 6.7 G/DL — SIGNIFICANT CHANGE UP (ref 6–8.3)
PROT UR-MCNC: 15 MG/DL
PROTHROM AB SERPL-ACNC: 11.7 SEC — SIGNIFICANT CHANGE UP (ref 10–12.9)
RAPID RVP RESULT: DETECTED
RBC # BLD: 4.91 M/UL — SIGNIFICANT CHANGE UP (ref 3.8–5.2)
RBC # FLD: 11.9 % — SIGNIFICANT CHANGE UP (ref 10.3–14.5)
RBC CASTS # UR COMP ASSIST: ABNORMAL /HPF (ref 0–4)
RSV RESULT: SIGNIFICANT CHANGE UP
RSV RNA RESP QL NAA+PROBE: SIGNIFICANT CHANGE UP
RV+EV RNA SPEC QL NAA+PROBE: DETECTED
SAO2 % BLDA: 93 % — SIGNIFICANT CHANGE UP (ref 92–96)
SODIUM SERPL-SCNC: 144 MMOL/L — SIGNIFICANT CHANGE UP (ref 135–145)
SP GR SPEC: 1.02 — SIGNIFICANT CHANGE UP (ref 1.01–1.02)
TROPONIN I SERPL-MCNC: 0.07 NG/ML — HIGH (ref 0.02–0.06)
TROPONIN I SERPL-MCNC: 0.07 NG/ML — HIGH (ref 0.02–0.06)
TROPONIN I SERPL-MCNC: 0.1 NG/ML — HIGH (ref 0.02–0.06)
UROBILINOGEN FLD QL: NEGATIVE MG/DL — SIGNIFICANT CHANGE UP
WBC # BLD: 8.84 K/UL — SIGNIFICANT CHANGE UP (ref 3.8–10.5)
WBC # FLD AUTO: 8.84 K/UL — SIGNIFICANT CHANGE UP (ref 3.8–10.5)
WBC UR QL: SIGNIFICANT CHANGE UP

## 2019-04-01 PROCEDURE — 71045 X-RAY EXAM CHEST 1 VIEW: CPT | Mod: 26

## 2019-04-01 PROCEDURE — 93010 ELECTROCARDIOGRAM REPORT: CPT

## 2019-04-01 PROCEDURE — 99285 EMERGENCY DEPT VISIT HI MDM: CPT

## 2019-04-01 PROCEDURE — 99223 1ST HOSP IP/OBS HIGH 75: CPT | Mod: 25

## 2019-04-01 PROCEDURE — 93306 TTE W/DOPPLER COMPLETE: CPT | Mod: 26

## 2019-04-01 RX ORDER — ENOXAPARIN SODIUM 100 MG/ML
40 INJECTION SUBCUTANEOUS EVERY 24 HOURS
Qty: 0 | Refills: 0 | Status: DISCONTINUED | OUTPATIENT
Start: 2019-04-01 | End: 2019-04-03

## 2019-04-01 RX ORDER — IPRATROPIUM/ALBUTEROL SULFATE 18-103MCG
3 AEROSOL WITH ADAPTER (GRAM) INHALATION EVERY 6 HOURS
Qty: 0 | Refills: 0 | Status: DISCONTINUED | OUTPATIENT
Start: 2019-04-01 | End: 2019-04-03

## 2019-04-01 RX ORDER — METOPROLOL TARTRATE 50 MG
12.5 TABLET ORAL EVERY 12 HOURS
Qty: 0 | Refills: 0 | Status: DISCONTINUED | OUTPATIENT
Start: 2019-04-01 | End: 2019-04-03

## 2019-04-01 RX ORDER — ACETAMINOPHEN 500 MG
650 TABLET ORAL EVERY 6 HOURS
Qty: 0 | Refills: 0 | Status: DISCONTINUED | OUTPATIENT
Start: 2019-04-01 | End: 2019-04-03

## 2019-04-01 RX ORDER — PANTOPRAZOLE SODIUM 20 MG/1
40 TABLET, DELAYED RELEASE ORAL
Qty: 0 | Refills: 0 | Status: DISCONTINUED | OUTPATIENT
Start: 2019-04-01 | End: 2019-04-03

## 2019-04-01 RX ORDER — IPRATROPIUM/ALBUTEROL SULFATE 18-103MCG
3 AEROSOL WITH ADAPTER (GRAM) INHALATION ONCE
Qty: 0 | Refills: 0 | Status: COMPLETED | OUTPATIENT
Start: 2019-04-01 | End: 2019-04-01

## 2019-04-01 RX ORDER — ASPIRIN/CALCIUM CARB/MAGNESIUM 324 MG
325 TABLET ORAL DAILY
Qty: 0 | Refills: 0 | Status: DISCONTINUED | OUTPATIENT
Start: 2019-04-01 | End: 2019-04-03

## 2019-04-01 RX ORDER — SODIUM CHLORIDE 9 MG/ML
1000 INJECTION INTRAMUSCULAR; INTRAVENOUS; SUBCUTANEOUS ONCE
Qty: 0 | Refills: 0 | Status: COMPLETED | OUTPATIENT
Start: 2019-04-01 | End: 2019-04-01

## 2019-04-01 RX ORDER — ONDANSETRON 8 MG/1
4 TABLET, FILM COATED ORAL EVERY 6 HOURS
Qty: 0 | Refills: 0 | Status: DISCONTINUED | OUTPATIENT
Start: 2019-04-01 | End: 2019-04-03

## 2019-04-01 RX ORDER — ONDANSETRON 8 MG/1
4 TABLET, FILM COATED ORAL ONCE
Qty: 0 | Refills: 0 | Status: COMPLETED | OUTPATIENT
Start: 2019-04-01 | End: 2019-04-01

## 2019-04-01 RX ADMIN — PANTOPRAZOLE SODIUM 40 MILLIGRAM(S): 20 TABLET, DELAYED RELEASE ORAL at 12:11

## 2019-04-01 RX ADMIN — Medication 325 MILLIGRAM(S): at 12:13

## 2019-04-01 RX ADMIN — Medication 3 MILLILITER(S): at 19:19

## 2019-04-01 RX ADMIN — SODIUM CHLORIDE 1000 MILLILITER(S): 9 INJECTION INTRAMUSCULAR; INTRAVENOUS; SUBCUTANEOUS at 05:59

## 2019-04-01 RX ADMIN — Medication 40 MILLIGRAM(S): at 13:42

## 2019-04-01 RX ADMIN — Medication 40 MILLIGRAM(S): at 22:17

## 2019-04-01 RX ADMIN — Medication 3 MILLILITER(S): at 13:36

## 2019-04-01 RX ADMIN — ENOXAPARIN SODIUM 40 MILLIGRAM(S): 100 INJECTION SUBCUTANEOUS at 12:11

## 2019-04-01 RX ADMIN — Medication 125 MILLIGRAM(S): at 06:15

## 2019-04-01 RX ADMIN — Medication 12.5 MILLIGRAM(S): at 20:43

## 2019-04-01 RX ADMIN — Medication 3 MILLILITER(S): at 06:24

## 2019-04-01 RX ADMIN — SODIUM CHLORIDE 1000 MILLILITER(S): 9 INJECTION INTRAMUSCULAR; INTRAVENOUS; SUBCUTANEOUS at 06:59

## 2019-04-01 RX ADMIN — ONDANSETRON 4 MILLIGRAM(S): 8 TABLET, FILM COATED ORAL at 06:00

## 2019-04-01 RX ADMIN — Medication 3 MILLILITER(S): at 23:46

## 2019-04-01 NOTE — H&P ADULT - PROBLEM SELECTOR PLAN 2
Patient with mildly elevated troponin level.  Most likely secondary to demand ischemia from her respiratory illness.  Doubt ACS.  cardiology input noted and appreciated.  Monitor serial CPKs and troponin.

## 2019-04-01 NOTE — H&P ADULT - PROBLEM SELECTOR PLAN 4
Check lipid panel in a.m.  Will hold statins at this time due to patient's history of abnormal liver function tests in the past.

## 2019-04-01 NOTE — H&P ADULT - NSHPREVIEWOFSYSTEMS_GEN_ALL_CORE
REVIEW OF SYSTEMS:  CONSTITUTIONAL: No fever,  or fatigue  EYES: No eye pain, or discharge  ENMT:  No sinus or throat pain  NECK: No pain or stiffness  BREASTS: No pain, masses, or nipple discharge  RESPIRATORY: + cough, + wheezing, + shortness of breath  CARDIOVASCULAR: + chest pain, No palpitations, dizziness, or leg swelling  GASTROINTESTINAL: No abdominal or epigastric pain. No nausea, vomiting, or hematemesis; No diarrhea or constipation. No melena or hematochezia.  GENITOURINARY: No dysuria, frequency, hematuria, or incontinence  NEUROLOGICAL: No headaches, memory loss, loss of strength, numbness, or tremors  SKIN: No itching, burning, rashes, or lesions   LYMPH NODES: No enlarged glands  ENDOCRINE: No heat or cold intolerance; No hair loss; No polydipsia or polyuria  MUSCULOSKELETAL: No joint pain or swelling; No muscle, back, or extremity pain  PSYCHIATRIC: No depression, anxiety, mood swings, or difficulty sleeping  HEME/LYMPH: No easy bruising, or bleeding gums  ALLERGY AND IMMUNOLOGIC: No hives or eczema

## 2019-04-01 NOTE — PROGRESS NOTE ADULT - SUBJECTIVE AND OBJECTIVE BOX
PULMONARY/CRITICAL CARE      INTERVAL HPI/OVERNIGHT EVENTS:    · Chief Complaint: The patient is a 69y Female well known to me 	complaining of chest pain, cough prod yellow sputum, wheeze, sob.   Chest discomfort when coughing.  Low grade temps.  · HPI Objective Statement: Patient developed cough and throat pain yesterday. Went to urgent care and put on levaquin. Since then, feeling worse. This morning c/o vomiting, feeling sweaty, and short of breath. No runny nose. No travel, no sick contacts. Has history of pneumonia last year.  · Presenting Symptoms: COUGH, FEVER, SHORTNESS OF BREATH          PAST MEDICAL & SURGICAL HISTORY:  Elevated liver enzymes: is being followed up by gasteroenterologist  Fibroid uterus  Hyperlipidemia  H/O arthroscopy of right knee: 5 years ago  H/O abdominal hysterectomy: 17 years ago        ICU Vital Signs Last 24 Hrs  T(C): 36.8 (2019 05:34), Max: 36.8 (2019 05:34)  T(F): 98.2 (2019 05:34), Max: 98.2 (2019 05:34)  HR: 78 (2019 06:35) (78 - 108)  BP: 112/74 (2019 06:35) (112/74 - 133/73)  BP(mean): --  ABP: --  ABP(mean): --  RR: 20 (2019 06:35) (20 - 25)  SpO2: 98% (2019 06:35) (90% - 98%)    Qtts:     I&O's Summary          REVIEW OF SYSTEMS:    CONSTITUTIONAL: had fever, EYES: No eye pain, visual disturbances, or discharge  ENMT:  No difficulty hearing, tinnitus, vertigo; No sinus or throat pain  NECK: No pain or stiffness  BREASTS: No pain, masses, or nipple discharge  RESPIRATORY: has cough, wheezing, chills, no  hemoptysis; has  shortness of breath  CARDIOVASCULAR: had  chest pain, palpitations, dizziness, or leg swelling  GASTROINTESTINAL: No abdominal or epigastric pain. No nausea, vomiting, or hematemesis; No diarrhea or constipation. No melena or hematochezia.  GENITOURINARY: No dysuria, frequency, hematuria, or incontinence  NEUROLOGICAL: No headaches, memory loss, loss of strength, numbness, or tremors  SKIN: No itching, burning, rashes, or lesions   LYMPH NODES: No enlarged glands  ENDOCRINE: No heat or cold intolerance; No hair loss  MUSCULOSKELETAL: No joint pain or swelling; No muscle, back, or extremity pain, no calf tenderness  PSYCHIATRIC: No depression, anxiety, mood swings, or difficulty sleeping  HEME/LYMPH: No easy bruising, or bleeding gums  ALLERGY AND IMMUNOLOGIC: No hives or eczema      PHYSICAL EXAM:    GENERAL:  well-groomed, well-developed, coughing frequently, mild sob  HEAD:  Atraumatic, Normocephalic  EYES: EOMI, PERRLA, conjunctiva and sclera clear  ENMT: No tonsillar erythema, exudates, or enlargement; Moist mucous membranes, Good dentition, No lesions  NECK: Supple, No JVD, Normal thyroid  NERVOUS SYSTEM:  Alert & Oriented X3, Good concentration; Motor Strength 5/5 B/L upper and lower extremities  CHEST/LUNG: few bilat, rhonchi, wheezing, good excursion  HEART: Regular rate and rhythm; No murmurs, rubs, or gallops  ABDOMEN: Soft, Nontender, Nondistended; Bowel sounds present  EXTREMITIES:  2+ Peripheral Pulses, No clubbing, cyanosis, or edema  LYMPH: No lymphadenopathy noted  SKIN: No rashes or lesions        LABS:                        14.6   8.84  )-----------( 187      ( 2019 06:04 )             43.6     04-    144  |  106  |  14  ----------------------------<  165<H>  3.9   |  24  |  0.71    Ca    8.9      2019 06:04  Mg     1.8     -    TPro  6.7  /  Alb  3.9  /  TBili  0.6  /  DBili  x   /  AST  19  /  ALT  24  /  AlkPhos  74  04-01    PT/INR - ( 2019 06:04 )   PT: 11.7 sec;   INR: 1.07 ratio         PTT - ( 2019 06:04 )  PTT:25.7 sec  Urinalysis Basic - ( 2019 07:49 )    Color: Yellow / Appearance: Slightly Turbid / S.025 / pH: x  Gluc: x / Ketone: Negative  / Bili: Negative / Urobili: Negative mg/dL   Blood: x / Protein: 15 mg/dL / Nitrite: Negative   Leuk Esterase: Small / RBC: x / WBC x   Sq Epi: x / Non Sq Epi: x / Bacteria: x        vanco through   EKG ok  RADIOLOGY & ADDITIONAL STUDIES:  CXR slight increased markings rll, unchanged from prior

## 2019-04-01 NOTE — CONSULT NOTE ADULT - PROBLEM SELECTOR RECOMMENDATION 9
which appears to be atypical may be due to cough , , however she does have borderline elevated troponin ?  rule out ACS demand ischemia or error  with significant family hx of CAD  would continue ecotrin  low dose BB , no statin as patient transaminitis  serial ekg , echo , will reassess based on clinical course

## 2019-04-01 NOTE — H&P ADULT - PROBLEM SELECTOR PLAN 1
Patient with atypical chest pain.   She has elevated troponin.   Doubt ACS.  We will place patient on aspirin and low-dose beta blockers.  Cardiology consultation noted and appreciated.  Check echocardiogram results.  Further management as per patient clinical course.

## 2019-04-01 NOTE — PROVIDER CONTACT NOTE (CRITICAL VALUE NOTIFICATION) - ASSESSMENT
awake and alert x3, no complain of chest pain or discomfort, no distress so far. /68, HR 89, SR on cardiac monitor. 97% on 2L/NC of oxygen

## 2019-04-01 NOTE — CONSULT NOTE ADULT - PROBLEM SELECTOR RECOMMENDATION 4
borderline , normal range D DImer . probably due to demand ischemia ? rule out ACS , serial EKG , echo , will reassess based on clinical course .
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
## 2019-04-01 NOTE — ED PROVIDER NOTE - CLINICAL SUMMARY MEDICAL DECISION MAKING FREE TEXT BOX
Patient with cough/fever, hypoxia. Will get cxr, labs. Will likely require admission for ongoing care

## 2019-04-01 NOTE — H&P ADULT - NSICDXPASTMEDICALHX_GEN_ALL_CORE_FT
PAST MEDICAL HISTORY:  Elevated liver enzymes is being followed up by gasteroenterologist    Fibroid uterus     Hyperlipidemia

## 2019-04-01 NOTE — H&P ADULT - NSHPSOCIALHISTORY_GEN_ALL_CORE
Social History:    Marital Status:   Occupation: Retired  Lives with: Spouse    Substance Use : Denies  Tobacco Usage:  (X) never smoked   (   ) former smoker   (   ) current smoker  (     ) pack year  (        ) last tobacco use date  Alcohol Usage: Denies Social History:    Marital Status:   Occupation:   Lives with: Spouse    Substance Use : Denies  Tobacco Usage:  (X) never smoked   (   ) former smoker   (   ) current smoker  (     ) pack year  (        ) last tobacco use date  Alcohol Usage: Denies

## 2019-04-01 NOTE — ED PROVIDER NOTE - OBJECTIVE STATEMENT
Patient developed cough and throat pain yesterday. Went to urgent care and put on levaquin. Since then, feeling worse. This morning c/o vomiting, feeling sweaty, and short of breath. No runny nose. No travel, no sick contacts. Has history of pneumonia last year.

## 2019-04-01 NOTE — H&P ADULT - NSICDXPASTSURGICALHX_GEN_ALL_CORE_FT
PAST SURGICAL HISTORY:  H/O abdominal hysterectomy 17 years ago    H/O arthroscopy of right knee 5 years ago

## 2019-04-01 NOTE — ED ADULT NURSE REASSESSMENT NOTE - NS ED NURSE REASSESS COMMENT FT1
0730- Pt received resting comfortably on stretcher with  bedside. A & O.  Color fair. Skin warm & dry to touch. Lungs- diminished @ bases- no wheeze noted. CM- RSR without ectopics noted. HL site in Rt AC without signs of infiltrate. O2 in use via NC
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
## 2019-04-01 NOTE — ED PROVIDER NOTE - PROGRESS NOTE DETAILS
Air movement much improved after nebulizer rx. Patient feels better, though still c/o throat pain. Dr. Calix to see patient in ED

## 2019-04-01 NOTE — H&P ADULT - PROBLEM SELECTOR PLAN 3
Patient with acute bronchitis, most likely post viral.  Will continue patient on empiric antibiotics and steroids as per pulmonary.  Continue bronchodilators as needed.  Oxygen supplementation as needed.  Pulmonary follow-up.

## 2019-04-01 NOTE — ED ADULT NURSE REASSESSMENT NOTE - NS ED NURSE REASSESS COMMENT FT1
0800- Dr Calix in attendance. Pt to be admitted with dx: pneumonia/CP 0800- Dr Calix in attendance. Pt to be admitted with dx: bronchitis/CP
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
## 2019-04-01 NOTE — H&P ADULT - NSICDXFAMILYHX_GEN_ALL_CORE_FT
FAMILY HISTORY:  Sibling  Still living? No  Family history of heart attack, Age at diagnosis: Age Unknown

## 2019-04-01 NOTE — H&P ADULT - NSHPPHYSICALEXAM_GEN_ALL_CORE
Vital Signs Last 24 Hrs  T(C): 37.1 (01 Apr 2019 07:30), Max: 37.1 (01 Apr 2019 07:30)  T(F): 98.8 (01 Apr 2019 07:30), Max: 98.8 (01 Apr 2019 07:30)  HR: 92 (01 Apr 2019 09:45) (78 - 108)  BP: 116/64 (01 Apr 2019 10:15) (112/74 - 133/73)  BP(mean): --  RR: 17 (01 Apr 2019 09:45) (17 - 25)  SpO2: 93% (01 Apr 2019 09:45) (90% - 98%)    PHYSICAL EXAM:  GENERAL:  well-groomed, well-developed  HEAD:  Atraumatic, Normocephalic  EYES: EOMI, PERRLA, conjunctiva and sclera clear  ENMT: Moist mucous membranes, No lesions  NECK: Supple,   CHEST/LUNG: Bilateral fair air entry with clear breath sounds.   HEART: S1, S2,   ABDOMEN: Soft, Nontender, Nondistended; Bowel sounds present  EXTREMITIES:  2+ Peripheral Pulses, No clubbing, cyanosis, or edema  MS: No joint swelling or deformity.   LYMPH: No lymphadenopathy noted  SKIN: No rashes or lesions  NERVOUS SYSTEM:  No focal deficit.   PSYCH:  Alert & Oriented X3, Good concentration.

## 2019-04-01 NOTE — H&P ADULT - NSHPLABSRESULTS_GEN_ALL_CORE
14.6   8.84  )-----------( 187      ( 01 Apr 2019 06:04 )             43.6     01 Apr 2019 06:04    144    |  106    |  14     ----------------------------<  165    3.9     |  24     |  0.71     Ca    8.9        01 Apr 2019 06:04  Mg     1.8       01 Apr 2019 06:04    TPro  6.7    /  Alb  3.9    /  TBili  0.6    /  DBili  x      /  AST  19     /  ALT  24     /  AlkPhos  74     01 Apr 2019 06:04      PT/INR - ( 01 Apr 2019 06:04 )   PT: 11.7 sec;   INR: 1.07 ratio         PTT - ( 01 Apr 2019 06:04 )  PTT:25.7 sec    Troponin I, Serum: .100 ng/mL (04-01 @ 06:05)

## 2019-04-02 LAB
ANION GAP SERPL CALC-SCNC: 11 MMOL/L — SIGNIFICANT CHANGE UP (ref 5–17)
BASOPHILS # BLD AUTO: 0.01 K/UL — SIGNIFICANT CHANGE UP (ref 0–0.2)
BASOPHILS NFR BLD AUTO: 0.1 % — SIGNIFICANT CHANGE UP (ref 0–2)
BUN SERPL-MCNC: 15 MG/DL — SIGNIFICANT CHANGE UP (ref 7–23)
CALCIUM SERPL-MCNC: 8.9 MG/DL — SIGNIFICANT CHANGE UP (ref 8.4–10.5)
CHLORIDE SERPL-SCNC: 107 MMOL/L — SIGNIFICANT CHANGE UP (ref 96–108)
CHOLEST SERPL-MCNC: 212 MG/DL — HIGH (ref 10–199)
CK SERPL-CCNC: 117 U/L — SIGNIFICANT CHANGE UP (ref 26–192)
CO2 SERPL-SCNC: 26 MMOL/L — SIGNIFICANT CHANGE UP (ref 22–31)
CREAT SERPL-MCNC: 0.83 MG/DL — SIGNIFICANT CHANGE UP (ref 0.5–1.3)
CULTURE RESULTS: SIGNIFICANT CHANGE UP
EOSINOPHIL # BLD AUTO: 0 K/UL — SIGNIFICANT CHANGE UP (ref 0–0.5)
EOSINOPHIL NFR BLD AUTO: 0 % — SIGNIFICANT CHANGE UP (ref 0–6)
GLUCOSE SERPL-MCNC: 150 MG/DL — HIGH (ref 70–99)
HBA1C BLD-MCNC: 5.9 % — HIGH (ref 4–5.6)
HCT VFR BLD CALC: 39.3 % — SIGNIFICANT CHANGE UP (ref 34.5–45)
HCV AB S/CO SERPL IA: 0.07 S/CO — SIGNIFICANT CHANGE UP (ref 0–0.79)
HCV AB SERPL-IMP: SIGNIFICANT CHANGE UP
HDLC SERPL-MCNC: 47 MG/DL — LOW
HGB BLD-MCNC: 13.1 G/DL — SIGNIFICANT CHANGE UP (ref 11.5–15.5)
IMM GRANULOCYTES NFR BLD AUTO: 0.4 % — SIGNIFICANT CHANGE UP (ref 0–1.5)
LIPID PNL WITH DIRECT LDL SERPL: 143 MG/DL — HIGH
LYMPHOCYTES # BLD AUTO: 0.8 K/UL — LOW (ref 1–3.3)
LYMPHOCYTES # BLD AUTO: 6.2 % — LOW (ref 13–44)
MAGNESIUM SERPL-MCNC: 2.1 MG/DL — SIGNIFICANT CHANGE UP (ref 1.6–2.6)
MCHC RBC-ENTMCNC: 29.4 PG — SIGNIFICANT CHANGE UP (ref 27–34)
MCHC RBC-ENTMCNC: 33.3 GM/DL — SIGNIFICANT CHANGE UP (ref 32–36)
MCV RBC AUTO: 88.1 FL — SIGNIFICANT CHANGE UP (ref 80–100)
MONOCYTES # BLD AUTO: 0.31 K/UL — SIGNIFICANT CHANGE UP (ref 0–0.9)
MONOCYTES NFR BLD AUTO: 2.4 % — SIGNIFICANT CHANGE UP (ref 2–14)
NEUTROPHILS # BLD AUTO: 11.67 K/UL — HIGH (ref 1.8–7.4)
NEUTROPHILS NFR BLD AUTO: 90.9 % — HIGH (ref 43–77)
NRBC # BLD: 0 /100 WBCS — SIGNIFICANT CHANGE UP (ref 0–0)
PHOSPHATE SERPL-MCNC: 3.9 MG/DL — SIGNIFICANT CHANGE UP (ref 2.5–4.5)
PLATELET # BLD AUTO: 202 K/UL — SIGNIFICANT CHANGE UP (ref 150–400)
POTASSIUM SERPL-MCNC: 3.8 MMOL/L — SIGNIFICANT CHANGE UP (ref 3.5–5.3)
POTASSIUM SERPL-SCNC: 3.8 MMOL/L — SIGNIFICANT CHANGE UP (ref 3.5–5.3)
RBC # BLD: 4.46 M/UL — SIGNIFICANT CHANGE UP (ref 3.8–5.2)
RBC # FLD: 12 % — SIGNIFICANT CHANGE UP (ref 10.3–14.5)
SODIUM SERPL-SCNC: 144 MMOL/L — SIGNIFICANT CHANGE UP (ref 135–145)
SPECIMEN SOURCE: SIGNIFICANT CHANGE UP
T3 SERPL-MCNC: 59 NG/DL — LOW (ref 80–200)
T4 AB SER-ACNC: 6.5 UG/DL — SIGNIFICANT CHANGE UP (ref 4.6–12)
TOTAL CHOLESTEROL/HDL RATIO MEASUREMENT: 4.5 RATIO — SIGNIFICANT CHANGE UP (ref 3.3–7.1)
TRIGL SERPL-MCNC: 111 MG/DL — SIGNIFICANT CHANGE UP (ref 10–149)
TROPONIN I SERPL-MCNC: 0.07 NG/ML — HIGH (ref 0.02–0.06)
TSH SERPL-MCNC: 0.64 UIU/ML — SIGNIFICANT CHANGE UP (ref 0.27–4.2)
WBC # BLD: 12.84 K/UL — HIGH (ref 3.8–10.5)
WBC # FLD AUTO: 12.84 K/UL — HIGH (ref 3.8–10.5)

## 2019-04-02 PROCEDURE — 99233 SBSQ HOSP IP/OBS HIGH 50: CPT

## 2019-04-02 PROCEDURE — 71045 X-RAY EXAM CHEST 1 VIEW: CPT | Mod: 26

## 2019-04-02 RX ADMIN — Medication 325 MILLIGRAM(S): at 11:49

## 2019-04-02 RX ADMIN — Medication 200 MILLIGRAM(S): at 11:52

## 2019-04-02 RX ADMIN — ENOXAPARIN SODIUM 40 MILLIGRAM(S): 100 INJECTION SUBCUTANEOUS at 11:48

## 2019-04-02 RX ADMIN — Medication 3 MILLILITER(S): at 20:06

## 2019-04-02 RX ADMIN — Medication 200 MILLIGRAM(S): at 18:49

## 2019-04-02 RX ADMIN — Medication 40 MILLIGRAM(S): at 22:24

## 2019-04-02 RX ADMIN — Medication 12.5 MILLIGRAM(S): at 17:41

## 2019-04-02 RX ADMIN — Medication 40 MILLIGRAM(S): at 06:26

## 2019-04-02 RX ADMIN — Medication 3 MILLILITER(S): at 07:18

## 2019-04-02 RX ADMIN — Medication 40 MILLIGRAM(S): at 13:41

## 2019-04-02 RX ADMIN — Medication 12.5 MILLIGRAM(S): at 06:26

## 2019-04-02 RX ADMIN — Medication 3 MILLILITER(S): at 13:01

## 2019-04-02 RX ADMIN — PANTOPRAZOLE SODIUM 40 MILLIGRAM(S): 20 TABLET, DELAYED RELEASE ORAL at 06:26
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
## 2019-04-03 ENCOUNTER — TRANSCRIPTION ENCOUNTER (OUTPATIENT)
Age: 69
End: 2019-04-03

## 2019-04-03 VITALS
RESPIRATION RATE: 18 BRPM | OXYGEN SATURATION: 96 % | SYSTOLIC BLOOD PRESSURE: 145 MMHG | HEART RATE: 103 BPM | DIASTOLIC BLOOD PRESSURE: 76 MMHG | TEMPERATURE: 100 F

## 2019-04-03 DIAGNOSIS — J44.1 CHRONIC OBSTRUCTIVE PULMONARY DISEASE WITH (ACUTE) EXACERBATION: ICD-10-CM

## 2019-04-03 LAB
ALBUMIN SERPL ELPH-MCNC: 3.2 G/DL — LOW (ref 3.3–5)
ALP SERPL-CCNC: 56 U/L — SIGNIFICANT CHANGE UP (ref 30–120)
ALT FLD-CCNC: 23 U/L DA — SIGNIFICANT CHANGE UP (ref 10–60)
ANION GAP SERPL CALC-SCNC: 9 MMOL/L — SIGNIFICANT CHANGE UP (ref 5–17)
AST SERPL-CCNC: 15 U/L — SIGNIFICANT CHANGE UP (ref 10–40)
BILIRUB SERPL-MCNC: 0.2 MG/DL — SIGNIFICANT CHANGE UP (ref 0.2–1.2)
BUN SERPL-MCNC: 20 MG/DL — SIGNIFICANT CHANGE UP (ref 7–23)
CALCIUM SERPL-MCNC: 9 MG/DL — SIGNIFICANT CHANGE UP (ref 8.4–10.5)
CHLORIDE SERPL-SCNC: 107 MMOL/L — SIGNIFICANT CHANGE UP (ref 96–108)
CO2 SERPL-SCNC: 28 MMOL/L — SIGNIFICANT CHANGE UP (ref 22–31)
CREAT SERPL-MCNC: 0.63 MG/DL — SIGNIFICANT CHANGE UP (ref 0.5–1.3)
GLUCOSE SERPL-MCNC: 150 MG/DL — HIGH (ref 70–99)
HCT VFR BLD CALC: 37.7 % — SIGNIFICANT CHANGE UP (ref 34.5–45)
HGB BLD-MCNC: 12.7 G/DL — SIGNIFICANT CHANGE UP (ref 11.5–15.5)
MCHC RBC-ENTMCNC: 30 PG — SIGNIFICANT CHANGE UP (ref 27–34)
MCHC RBC-ENTMCNC: 33.7 GM/DL — SIGNIFICANT CHANGE UP (ref 32–36)
MCV RBC AUTO: 89.1 FL — SIGNIFICANT CHANGE UP (ref 80–100)
NRBC # BLD: 0 /100 WBCS — SIGNIFICANT CHANGE UP (ref 0–0)
PLATELET # BLD AUTO: 205 K/UL — SIGNIFICANT CHANGE UP (ref 150–400)
POTASSIUM SERPL-MCNC: 4.3 MMOL/L — SIGNIFICANT CHANGE UP (ref 3.5–5.3)
POTASSIUM SERPL-SCNC: 4.3 MMOL/L — SIGNIFICANT CHANGE UP (ref 3.5–5.3)
PROT SERPL-MCNC: 6.3 G/DL — SIGNIFICANT CHANGE UP (ref 6–8.3)
RBC # BLD: 4.23 M/UL — SIGNIFICANT CHANGE UP (ref 3.8–5.2)
RBC # FLD: 12.5 % — SIGNIFICANT CHANGE UP (ref 10.3–14.5)
SODIUM SERPL-SCNC: 144 MMOL/L — SIGNIFICANT CHANGE UP (ref 135–145)
WBC # BLD: 14.78 K/UL — HIGH (ref 3.8–10.5)
WBC # FLD AUTO: 14.78 K/UL — HIGH (ref 3.8–10.5)

## 2019-04-03 PROCEDURE — 71045 X-RAY EXAM CHEST 1 VIEW: CPT | Mod: 26

## 2019-04-03 PROCEDURE — 84480 ASSAY TRIIODOTHYRONINE (T3): CPT

## 2019-04-03 PROCEDURE — 36600 WITHDRAWAL OF ARTERIAL BLOOD: CPT

## 2019-04-03 PROCEDURE — 80053 COMPREHEN METABOLIC PANEL: CPT

## 2019-04-03 PROCEDURE — 84100 ASSAY OF PHOSPHORUS: CPT

## 2019-04-03 PROCEDURE — 83036 HEMOGLOBIN GLYCOSYLATED A1C: CPT

## 2019-04-03 PROCEDURE — 82803 BLOOD GASES ANY COMBINATION: CPT

## 2019-04-03 PROCEDURE — 81001 URINALYSIS AUTO W/SCOPE: CPT

## 2019-04-03 PROCEDURE — 93005 ELECTROCARDIOGRAM TRACING: CPT

## 2019-04-03 PROCEDURE — 87631 RESP VIRUS 3-5 TARGETS: CPT

## 2019-04-03 PROCEDURE — 87798 DETECT AGENT NOS DNA AMP: CPT

## 2019-04-03 PROCEDURE — 84484 ASSAY OF TROPONIN QUANT: CPT

## 2019-04-03 PROCEDURE — 83690 ASSAY OF LIPASE: CPT

## 2019-04-03 PROCEDURE — 87581 M.PNEUMON DNA AMP PROBE: CPT

## 2019-04-03 PROCEDURE — 36415 COLL VENOUS BLD VENIPUNCTURE: CPT

## 2019-04-03 PROCEDURE — 86803 HEPATITIS C AB TEST: CPT

## 2019-04-03 PROCEDURE — 82550 ASSAY OF CK (CPK): CPT

## 2019-04-03 PROCEDURE — 87486 CHLMYD PNEUM DNA AMP PROBE: CPT

## 2019-04-03 PROCEDURE — 85730 THROMBOPLASTIN TIME PARTIAL: CPT

## 2019-04-03 PROCEDURE — 94664 DEMO&/EVAL PT USE INHALER: CPT

## 2019-04-03 PROCEDURE — 96375 TX/PRO/DX INJ NEW DRUG ADDON: CPT

## 2019-04-03 PROCEDURE — 83735 ASSAY OF MAGNESIUM: CPT

## 2019-04-03 PROCEDURE — 87633 RESP VIRUS 12-25 TARGETS: CPT

## 2019-04-03 PROCEDURE — 99285 EMERGENCY DEPT VISIT HI MDM: CPT | Mod: 25

## 2019-04-03 PROCEDURE — 80048 BASIC METABOLIC PNL TOTAL CA: CPT

## 2019-04-03 PROCEDURE — 96374 THER/PROPH/DIAG INJ IV PUSH: CPT

## 2019-04-03 PROCEDURE — 80061 LIPID PANEL: CPT

## 2019-04-03 PROCEDURE — 85027 COMPLETE CBC AUTOMATED: CPT

## 2019-04-03 PROCEDURE — 87086 URINE CULTURE/COLONY COUNT: CPT

## 2019-04-03 PROCEDURE — 96361 HYDRATE IV INFUSION ADD-ON: CPT

## 2019-04-03 PROCEDURE — 87040 BLOOD CULTURE FOR BACTERIA: CPT

## 2019-04-03 PROCEDURE — 99232 SBSQ HOSP IP/OBS MODERATE 35: CPT

## 2019-04-03 PROCEDURE — 93306 TTE W/DOPPLER COMPLETE: CPT

## 2019-04-03 PROCEDURE — 71045 X-RAY EXAM CHEST 1 VIEW: CPT

## 2019-04-03 PROCEDURE — 83605 ASSAY OF LACTIC ACID: CPT

## 2019-04-03 PROCEDURE — 85610 PROTHROMBIN TIME: CPT

## 2019-04-03 PROCEDURE — 84145 PROCALCITONIN (PCT): CPT

## 2019-04-03 PROCEDURE — 93010 ELECTROCARDIOGRAM REPORT: CPT

## 2019-04-03 PROCEDURE — 85379 FIBRIN DEGRADATION QUANT: CPT

## 2019-04-03 PROCEDURE — 94640 AIRWAY INHALATION TREATMENT: CPT

## 2019-04-03 PROCEDURE — 84436 ASSAY OF TOTAL THYROXINE: CPT

## 2019-04-03 PROCEDURE — 84443 ASSAY THYROID STIM HORMONE: CPT

## 2019-04-03 RX ORDER — ASPIRIN/CALCIUM CARB/MAGNESIUM 324 MG
1 TABLET ORAL
Qty: 0 | Refills: 0 | COMMUNITY
Start: 2019-04-03

## 2019-04-03 RX ADMIN — Medication 40 MILLIGRAM(S): at 05:47

## 2019-04-03 RX ADMIN — Medication 3 MILLILITER(S): at 13:17

## 2019-04-03 RX ADMIN — Medication 12.5 MILLIGRAM(S): at 05:47

## 2019-04-03 RX ADMIN — Medication 12.5 MILLIGRAM(S): at 17:31

## 2019-04-03 RX ADMIN — Medication 40 MILLIGRAM(S): at 13:50

## 2019-04-03 RX ADMIN — Medication 3 MILLILITER(S): at 07:47

## 2019-04-03 RX ADMIN — Medication 325 MILLIGRAM(S): at 11:00

## 2019-04-03 RX ADMIN — Medication 3 MILLILITER(S): at 01:19

## 2019-04-03 RX ADMIN — ENOXAPARIN SODIUM 40 MILLIGRAM(S): 100 INJECTION SUBCUTANEOUS at 11:19

## 2019-04-03 RX ADMIN — PANTOPRAZOLE SODIUM 40 MILLIGRAM(S): 20 TABLET, DELAYED RELEASE ORAL at 05:47

## 2019-04-03 RX ADMIN — Medication 200 MILLIGRAM(S): at 01:36

## 2019-04-03 NOTE — PROGRESS NOTE ADULT - PROBLEM SELECTOR PROBLEM 4
COPD exacerbation
NSTEMI (non-ST elevation myocardial infarction)
Hyperlipidemia, unspecified hyperlipidemia type
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
## 2019-04-03 NOTE — PROGRESS NOTE ADULT - PROBLEM SELECTOR PLAN 4
Patient with COPD exacerbation, POA.   Improved with IV steroids and bronchodilators.   Continue prednisone taper as out patient.
cardio eval
cardio fu
cardio fu
Lipid panel result s discussed with patient.   She will benefit from statin therapy.   Patient will discuss with GI due to history of elevated LFT's in past.

## 2019-04-03 NOTE — DISCHARGE NOTE NURSING/CASE MANAGEMENT/SOCIAL WORK - NSDCPNDISPN_GEN_ALL_CORE
Opioids not applicable/not prescribed/Education provided on the pain management plan of care/Activities of daily living, including home environment that might     exacerbate pain or reduce effectiveness of the pain management plan of care as well as strategies to address these issues/Side effects of pain management treatment/Safe use, storage and disposal of opioids when prescribed

## 2019-04-03 NOTE — PROGRESS NOTE ADULT - PROBLEM SELECTOR PROBLEM 2
Elevated troponin
Pneumonia
Elevated troponin

## 2019-04-03 NOTE — PROGRESS NOTE ADULT - PROBLEM SELECTOR PLAN 1
Complete resolution and without chest pain during ambulation; likely related to viral infection.
Resolved; likely related to viral infection.
HHN  Steroids
HHN  Steroids
Patient with atypical chest pain.   Doubt ACS.   Cardiology input noted and appreciated.   Ruled out for MI.  Out patient stress test.
Steroid taper
Patient with atypical chest pain.   Doubt ACS.   Cardiology input noted and appreciated.   Ruled out for MI.

## 2019-04-03 NOTE — DISCHARGE NOTE PROVIDER - NSDCFUADDAPPT_GEN_ALL_CORE_FT
You have post hospitalization follow-up appointment scheduled for you on Wed 04/10/2019 @ 3PM with DR Rodo Calix [phone], if there is any conflict with any previous appointment then you may call doctor's office and re-schedule @ your earliest convenience.

## 2019-04-03 NOTE — DISCHARGE NOTE NURSING/CASE MANAGEMENT/SOCIAL WORK - NSDCFUADDAPPT_GEN_ALL_CORE_FT
You have post hospitalization follow-up appointment scheduled for you on Wed 04/10/2019 @ 3PM with DR Rodo Calix [phone], if there is any conflict with any previous appointment then you may call doctor's office and re-schedule @ your earliest convenience.

## 2019-04-03 NOTE — DISCHARGE NOTE PROVIDER - NSDCCPCAREPLAN_GEN_ALL_CORE_FT
PRINCIPAL DISCHARGE DIAGNOSIS  Diagnosis: Acute bronchitis, unspecified organism  Assessment and Plan of Treatment: Continue prednisone taper as advised.   Continue Inhalers as advised.   Follow up with Dr. Calix in 1 week.      SECONDARY DISCHARGE DIAGNOSES  Diagnosis: Elevated troponin  Assessment and Plan of Treatment: Continue Aspirin as advised.   Follow up with Dr. Hayward as out patient in 1-2 weeks for stress test.    Diagnosis: Hyperlipidemia, unspecified hyperlipidemia type  Assessment and Plan of Treatment: Consider restarting cholesterol medications if cleared by your GI doctor.    Diagnosis: Hyperlipidemia, unspecified hyperlipidemia type  Assessment and Plan of Treatment: Hyperlipidemia, unspecified hyperlipidemia type    Diagnosis: Bronchitis  Assessment and Plan of Treatment:
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
## 2019-04-03 NOTE — PROGRESS NOTE ADULT - PROBLEM SELECTOR PLAN 3
Cardio eval
Cardio fu
Cardio fu
Patient with acute bronchitis, most likely post viral.  Continue steroids as per pulmonary.  Continue bronchodilators as needed.  Oxygen supplementation as needed.  Pulmonary follow-up.
Patient with acute bronchitis, most likely post viral.  Continue steroids as per pulmonary.  Continue bronchodilators as needed.  Oxygen supplementation as needed.  Pulmonary follow-up.

## 2019-04-03 NOTE — PROGRESS NOTE ADULT - REASON FOR ADMISSION
Chest pain/Shortness of breath
Acute bronchitis, possible pn  Elevated troponin
Chest pain/Shortness of breath

## 2019-04-03 NOTE — DISCHARGE NOTE PROVIDER - PROVIDER TOKENS
PROVIDER:[TOKEN:[[id number]],FOLLOWUP:[1 week]],PROVIDER:[TOKEN:[[id number]],FOLLOWUP:[2 weeks]]
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
## 2019-04-03 NOTE — DISCHARGE NOTE PROVIDER - CARE PROVIDER_API CALL
Rodo Calix)  Critical Care Medicine; Internal Medicine; Pulmonary Disease  [address] [place] [address]  Phone: [phone]  Fax: [phone]  Follow Up Time: 1 week    Srinivasan Hayward)  Cardiovascular Disease; Internal Medicine  [address] [place] [address]  Phone: [phone]  Fax: [phone]  Follow Up Time: 2 weeks

## 2019-04-03 NOTE — PROGRESS NOTE ADULT - ATTENDING COMMENTS
Patient has room air resting O2 sat of 87%. Patient was placed back on O2 at 2 LPM via NV continuously and noted with improved O2 sat of 92%. Patient will need home O2 at 2 LPM via NC continuously due to chronic diagnosis of COPD. Patient has been treated for acute exacerbation of COPD and acute condition has been resolved and patient is in a chronic stable state. patient requires home O2 for safe discharge.    I spent more than 35 minutes on discharging the patient.

## 2019-04-03 NOTE — DISCHARGE NOTE PROVIDER - HOSPITAL COURSE
Patient admitted with chest pain and shortness of breath.     She was  ruled out for MI. Elevated troponin most likely due to demand ischemia from her pulmonary disease.         Patient had acute viral bronchitis with COPD exacerbation.     Patient improved with Steroids and bronchodilators. She was ruled out for pneumonia.     Patient is being discharged home on oral steroids, inhalers and oxygen as she is desaturating on ambulation.     Patient also has hyperlipidemia, she is encouraged to restart statins after being cleared by her GI.

## 2019-04-03 NOTE — PROGRESS NOTE ADULT - PROBLEM SELECTOR PLAN 2
Minimal elevation in serum troponin should be further evaluated with outpatient exercise stress test with myocardial perfusion imaging -- patient intends to follow-up in our office upon resolution of bronchitis.
Minimal elevation in serum troponin should be further evaluated with outpatient exercise stress test with myocardial perfusion imaging to assess for the presence of CAD; reasonable to continue aspirin for now.
Patient with mildly elevated troponin level.  Most likely secondary to demand ischemia from her respiratory illness.  cardiology input noted and appreciated.  Continue ASA and low dose Lopressor.   Will need out patient stress test.
Procalcitonin  RVP  Levaquin  FU cxr
Procalcitonin neg  RVP noted
Procalcitonin neg  RVP noted  Fu cxr  Levaquin can be dced
Patient with mildly elevated troponin level.  Most likely secondary to demand ischemia from her respiratory illness.  cardiology input noted and appreciated.  Continue ASA and low dose Lopressor.   Will need out patient stress test.

## 2019-04-03 NOTE — DISCHARGE NOTE PROVIDER - NSDCACTIVITY_GEN_ALL_CORE
Sex allowed/Walking - Outdoors allowed/Walking - Indoors allowed/No heavy lifting/straining/Bathing allowed
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
## 2019-04-03 NOTE — PROGRESS NOTE ADULT - PROBLEM SELECTOR PROBLEM 3
Acute bronchitis, unspecified organism
Elevated troponin
Acute bronchitis, unspecified organism

## 2019-04-03 NOTE — PROGRESS NOTE ADULT - PROBLEM SELECTOR PLAN 5
Lipid panel result s discussed with patient.   She will benefit from statin therapy.   Patient will discuss with GI due to history of elevated LFT's in past.
Liver function tests normal.  Monitor serial LFTs.

## 2019-04-03 NOTE — DISCHARGE NOTE NURSING/CASE MANAGEMENT/SOCIAL WORK - NSDCDPATPORTLINK_GEN_ALL_CORE
You can access the [facility] Portal, offered by [facility], by registering with the following website: [email or web address]

## 2019-05-03 ENCOUNTER — OUTPATIENT (OUTPATIENT)
Dept: OUTPATIENT SERVICES | Facility: HOSPITAL | Age: 69
LOS: 1 days | End: 2019-05-03
Payer: COMMERCIAL

## 2019-05-03 ENCOUNTER — APPOINTMENT (OUTPATIENT)
Dept: CT IMAGING | Facility: CLINIC | Age: 69
End: 2019-05-03
Payer: COMMERCIAL

## 2019-05-03 DIAGNOSIS — Z90.710 ACQUIRED ABSENCE OF BOTH CERVIX AND UTERUS: Chronic | ICD-10-CM

## 2019-05-03 DIAGNOSIS — Z98.890 OTHER SPECIFIED POSTPROCEDURAL STATES: Chronic | ICD-10-CM

## 2019-05-03 DIAGNOSIS — Z00.8 ENCOUNTER FOR OTHER GENERAL EXAMINATION: ICD-10-CM

## 2019-05-03 PROCEDURE — 71250 CT THORAX DX C-: CPT | Mod: 26

## 2019-05-03 PROCEDURE — 71250 CT THORAX DX C-: CPT

## 2019-05-22 NOTE — H&P PST ADULT - NECK DETAILS
[FreeTextEntry1] : HPI:\par Mr. Jung is a very pleasant 60-year-old gentleman, looking much younger than stated age, came for cardiac evaluation.\par \par His history includes ventricular myxoma resection in 1992.  Since then he was followed by cardiology, but he has not followed since last 10 years.  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  He never needed any medication.  His arrhythmia continued to be an issue but much less frequently.\par \par The patient does not exercise per se, very active, does get short of breath easily but no exertional chest pain.  Denies PND, orthopnea, diaphoresis, dizziness, pedal edema, or claudication symptoms.  His history includes obstructive sleep apnea syndrome.  He is noncompliant to CPAP machine, dyslipidemia, herniated disc, although back pain is much improved.  He has some numbness of the left leg.  He does follow with a spine surgeon.\par \par No history of CHF, MI, or syncope.\par 
no JVD/supple

## 2019-07-05 ENCOUNTER — APPOINTMENT (OUTPATIENT)
Dept: MAMMOGRAPHY | Facility: CLINIC | Age: 69
End: 2019-07-05
Payer: COMMERCIAL

## 2019-07-05 ENCOUNTER — OUTPATIENT (OUTPATIENT)
Dept: OUTPATIENT SERVICES | Facility: HOSPITAL | Age: 69
LOS: 1 days | End: 2019-07-05
Payer: COMMERCIAL

## 2019-07-05 ENCOUNTER — APPOINTMENT (OUTPATIENT)
Dept: ULTRASOUND IMAGING | Facility: CLINIC | Age: 69
End: 2019-07-05
Payer: COMMERCIAL

## 2019-07-05 DIAGNOSIS — E78.5 HYPERLIPIDEMIA, UNSPECIFIED: ICD-10-CM

## 2019-07-05 DIAGNOSIS — Z00.8 ENCOUNTER FOR OTHER GENERAL EXAMINATION: ICD-10-CM

## 2019-07-05 DIAGNOSIS — Z98.890 OTHER SPECIFIED POSTPROCEDURAL STATES: Chronic | ICD-10-CM

## 2019-07-05 DIAGNOSIS — C08.9 MALIGNANT NEOPLASM OF MAJOR SALIVARY GLAND, UNSPECIFIED: ICD-10-CM

## 2019-07-05 DIAGNOSIS — Z90.710 ACQUIRED ABSENCE OF BOTH CERVIX AND UTERUS: Chronic | ICD-10-CM

## 2019-07-05 PROCEDURE — 77063 BREAST TOMOSYNTHESIS BI: CPT | Mod: 26

## 2019-07-05 PROCEDURE — 76641 ULTRASOUND BREAST COMPLETE: CPT | Mod: 26,50

## 2019-07-05 PROCEDURE — 76641 ULTRASOUND BREAST COMPLETE: CPT

## 2019-07-05 PROCEDURE — 77067 SCR MAMMO BI INCL CAD: CPT

## 2019-07-05 PROCEDURE — 77067 SCR MAMMO BI INCL CAD: CPT | Mod: 26

## 2019-07-05 PROCEDURE — 77063 BREAST TOMOSYNTHESIS BI: CPT

## 2019-07-16 ENCOUNTER — TRANSCRIPTION ENCOUNTER (OUTPATIENT)
Age: 69
End: 2019-07-16

## 2019-07-18 ENCOUNTER — APPOINTMENT (OUTPATIENT)
Dept: SURGERY | Facility: CLINIC | Age: 69
End: 2019-07-18
Payer: COMMERCIAL

## 2019-07-18 PROCEDURE — 99213 OFFICE O/P EST LOW 20 MIN: CPT

## 2019-07-18 NOTE — HISTORY OF PRESENT ILLNESS
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 denies  bleeding, dysphagia, hoarseness. no changes medically since last visit.    right neck lesion:  denies pain, drainage or infection.  seen earlier for evaluation of sore throat  - strep test pending

## 2019-07-18 NOTE — PHYSICAL EXAM
[de-identified] : 1 cm right neck intradermal mass, well circumscribed [de-identified] : no palpable thyroid nodules [Laryngoscopy Performed] : laryngoscopy was performed, see procedure section for findings [Midline] : located in midline position [de-identified] : left lower lip mucosal scar with no evidence of recurrent disease. no new lesions noted [Normal] : orientation to person, place, and time: normal

## 2019-11-19 ENCOUNTER — APPOINTMENT (OUTPATIENT)
Dept: SURGERY | Facility: CLINIC | Age: 69
End: 2019-11-19
Payer: MEDICARE

## 2019-11-19 PROCEDURE — 99213 OFFICE O/P EST LOW 20 MIN: CPT

## 2019-11-19 NOTE — PHYSICAL EXAM
[de-identified] : no palpable thyroid nodules [de-identified] : 1 cm right neck intradermal mass, well circumscribed [Laryngoscopy Performed] : laryngoscopy was performed, see procedure section for findings [Midline] : located in midline position [de-identified] : left lower lip mucosal scar with no evidence of recurrent disease. no new lesions noted [Normal] : orientation to person, place, and time: normal

## 2019-11-19 NOTE — HISTORY OF PRESENT ILLNESS
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 denies  bleeding, dysphagia, hoarseness. no changes medically since last visit.    right neck lesion:  denies pain, drainage or infection.   no changes medically since last visit

## 2020-01-31 ENCOUNTER — APPOINTMENT (OUTPATIENT)
Dept: ULTRASOUND IMAGING | Facility: CLINIC | Age: 70
End: 2020-01-31
Payer: MEDICARE

## 2020-01-31 ENCOUNTER — OUTPATIENT (OUTPATIENT)
Dept: OUTPATIENT SERVICES | Facility: HOSPITAL | Age: 70
LOS: 1 days | End: 2020-01-31
Payer: MEDICARE

## 2020-01-31 DIAGNOSIS — Z98.890 OTHER SPECIFIED POSTPROCEDURAL STATES: Chronic | ICD-10-CM

## 2020-01-31 DIAGNOSIS — Z90.710 ACQUIRED ABSENCE OF BOTH CERVIX AND UTERUS: Chronic | ICD-10-CM

## 2020-01-31 DIAGNOSIS — Z00.8 ENCOUNTER FOR OTHER GENERAL EXAMINATION: ICD-10-CM

## 2020-01-31 PROCEDURE — 76536 US EXAM OF HEAD AND NECK: CPT

## 2020-01-31 PROCEDURE — 76536 US EXAM OF HEAD AND NECK: CPT | Mod: 26

## 2020-05-19 ENCOUNTER — APPOINTMENT (OUTPATIENT)
Dept: SURGERY | Facility: CLINIC | Age: 70
End: 2020-05-19
Payer: MEDICARE

## 2020-05-19 PROCEDURE — 99213 OFFICE O/P EST LOW 20 MIN: CPT

## 2020-05-19 NOTE — HISTORY OF PRESENT ILLNESS
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 denies  bleeding, dysphagia, hoarseness. no changes medically since last visit.    right neck lesion:  denies pain, drainage or infection.   no changes medically since last visit

## 2020-05-19 NOTE — PHYSICAL EXAM
[de-identified] : no palpable thyroid nodules [de-identified] : 1 cm right neck intradermal mass, well circumscribed [Laryngoscopy Performed] : laryngoscopy was performed, see procedure section for findings [de-identified] : left lower lip mucosal scar with no evidence of recurrent disease. no new lesions noted [Midline] : located in midline position [Normal] : orientation to person, place, and time: normal

## 2020-06-06 ENCOUNTER — TRANSCRIPTION ENCOUNTER (OUTPATIENT)
Age: 70
End: 2020-06-06

## 2020-08-19 ENCOUNTER — TRANSCRIPTION ENCOUNTER (OUTPATIENT)
Age: 70
End: 2020-08-19

## 2020-11-17 ENCOUNTER — APPOINTMENT (OUTPATIENT)
Dept: SURGERY | Facility: CLINIC | Age: 70
End: 2020-11-17
Payer: MEDICARE

## 2020-11-17 PROCEDURE — 99213 OFFICE O/P EST LOW 20 MIN: CPT

## 2020-11-17 RX ORDER — LEVOFLOXACIN 750 MG/1
750 TABLET, FILM COATED ORAL
Qty: 5 | Refills: 0 | Status: ACTIVE | COMMUNITY
Start: 2020-08-19

## 2020-11-17 RX ORDER — ALBUTEROL SULFATE 90 UG/1
108 (90 BASE) INHALANT RESPIRATORY (INHALATION)
Qty: 8 | Refills: 0 | Status: ACTIVE | COMMUNITY
Start: 2020-08-19

## 2020-11-17 RX ORDER — AZITHROMYCIN 250 MG/1
250 TABLET, FILM COATED ORAL
Qty: 6 | Refills: 0 | Status: ACTIVE | COMMUNITY
Start: 2020-08-18

## 2020-11-17 NOTE — HISTORY OF PRESENT ILLNESS
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 denies  bleeding, dysphagia, hoarseness. no changes medically since last visit.       no changes medically since last visit.  for eye surgery shortly

## 2020-12-10 ENCOUNTER — RESULT REVIEW (OUTPATIENT)
Age: 70
End: 2020-12-10

## 2021-03-31 NOTE — H&P PST ADULT - VENOUS THROMBOEMBOLISM
Patient is a 75yo F poor historian PMH uncontrolled DMT2, UC and colon cancer s/p colectomy and chemotherapy, chronic venous stasis w/ chronic LE wounds who presents due to frequent falls over the past several weeks at home. Admitted for DKA and b/l LE cellulits/abscess with c/f RLE necrotizing infection. 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 LLE w/ tendonitis/myositis, no concern for OM. Left foot surgical swab (3/13) growing staph aureus, strept intermedius, strept constellatus, bifidobacterium breve, Candida (likely colonizer). ID consulted with c/f infected hematoma at R BKA stump site, R BKA wound cx obtained and growing moderate klebsiella pneumoniae with rare E. faecalis. Transitioned nafcillin to zosyn today. CRP significantly improving.     Recommendations:   - dc zosyn as klebsiella sensitivities have resulted  - start unasyn 3g IV q6h for 2 week course, ending 4/12  - Bcxs 3/13 neg, 3/26 NGTD     Plan discussed with ID attending Dr. Hartman. ID Team 1 will sign off. Please re-consult with questions. no

## 2021-05-20 ENCOUNTER — APPOINTMENT (OUTPATIENT)
Dept: SURGERY | Facility: CLINIC | Age: 71
End: 2021-05-20
Payer: MEDICARE

## 2021-05-20 PROCEDURE — 99214 OFFICE O/P EST MOD 30 MIN: CPT

## 2021-05-20 NOTE — PHYSICAL EXAM
[de-identified] : 1 cm right neck intradermal mass, well circumscribed [de-identified] : no palpable thyroid nodules [Laryngoscopy Performed] : laryngoscopy was performed, see procedure section for findings [Midline] : located in midline position [de-identified] : left lower lip mucosal scar with no evidence of recurrent disease. no new lesions noted [Normal] : orientation to person, place, and time: normal

## 2021-05-20 NOTE — HISTORY OF PRESENT ILLNESS
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 denies  bleeding, dysphagia, hoarseness. no changes medically since last visit.       no changes medically since last visit.  I have reviewed all old and new data and available images.\par

## 2021-05-20 NOTE — ASSESSMENT
[FreeTextEntry1] : will observe. no indication for any studies.   to return earlier if any change.  patient has been given the opportunity to ask questions, and all of the patient's questions have been answered to their satisfaction\par

## 2021-07-23 NOTE — H&P ADULT - NSHPPOASURGSITEINCISION_GEN_ALL_CORE
Pt called asking about her bloodwork results tc/cma
[facility] re:TSH--in overactive range--I sent rx to [address] for lower dose l-thyroxine
no

## 2021-12-09 NOTE — PROGRESS NOTE ADULT - PROBLEM SELECTOR PROBLEM 4
Hyperlipidemia
Hyperlipidemia
R/O Atelectasis pulmonary
No

## 2021-12-13 NOTE — ED PROVIDER NOTE - BREATH SOUNDS
vital signs: T(C): 35.7 (12-13-21 @ 13:05), Max: 35.7 (12-13-21 @ 13:05)  HR: 79 (12-13-21 @ 13:05) (79 - 79)  BP: 112/78 (12-13-21 @ 13:05) (112/78 - 112/78)  BP(mean): --  RR: 20 (12-13-21 @ 13:05) (20 - 20)  SpO2: 98% (12-13-21 @ 13:05) (98% - 98%)  GEN - NAD; nontoxic appearing, [place]  HEAD - NC/AT   EYES- PERRL, EOMI  ENT: Airway patent, dry mm, Oral cavity and pharynx normal. No inflammation, swelling, exudate, or lesions.    NECK: Neck supple, no midline ttp, FROM  PULMONARY - CTA b/l, symmetric breath sounds.   CARDIAC -s1s2, RRR, no M,G,R  ABDOMEN - +BS, ND, +diffuse abd ttp, soft, no guarding, no rebound, no masses   BACK - no CVA tenderness, +midline ttp to lower thoracic and upper lumbar spinal and paraspinal regions, no stepoffs, no skin changes  EXTREMITIES - FROM, symmetric pulses, capillary refill < 2 seconds, no edema, 2nd and third toe with bruising that appears to be in later stage of healing, no subungual hematomas, good cap refill, ttp to both toes, does flex  SKIN - no rash or bruising   NEUROLOGIC - ao3, cn2-12 intact, full strength to b/l ue and le, sensation grossly intact.  PSYCH -nl mood/affect, nl insight. vital signs: T(C): 35.7 (12-13-21 @ 13:05), Max: 35.7 (12-13-21 @ 13:05)  HR: 79 (12-13-21 @ 13:05) (79 - 79)  BP: 112/78 (12-13-21 @ 13:05) (112/78 - 112/78)  BP(mean): --  RR: 20 (12-13-21 @ 13:05) (20 - 20)  SpO2: 98% (12-13-21 @ 13:05) (98% - 98%)  GEN - NAD; nontoxic appearing, [place]  HEAD - NC/AT   EYES- PERRL, EOMI  ENT: Airway patent, dry mm, Oral cavity and pharynx normal. No inflammation, swelling, exudate, or lesions.    NECK: Neck supple, no midline ttp, FROM  PULMONARY - CTA b/l, symmetric breath sounds.   CARDIAC -s1s2, RRR, +systolic murmur  ABDOMEN - +BS, ND, +diffuse abd ttp, soft, no guarding, no rebound, no masses   BACK - no CVA tenderness, +midline ttp to lower thoracic and upper lumbar spinal and paraspinal regions, no stepoffs, no skin changes  EXTREMITIES - FROM, symmetric pulses, capillary refill < 2 seconds, no edema, 2nd and third toe with bruising that appears to be in later stage of healing, no subungual hematomas, good cap refill, ttp to both toes, does flex  SKIN - no rash or bruising   NEUROLOGIC - ao3, cn2-12 intact, full strength to b/l ue and le, sensation grossly intact.  PSYCH -nl mood/affect, nl insight. DIMINISHED

## 2021-12-31 ENCOUNTER — OUTPATIENT (OUTPATIENT)
Dept: OUTPATIENT SERVICES | Facility: HOSPITAL | Age: 71
LOS: 1 days | End: 2021-12-31
Payer: MEDICARE

## 2021-12-31 ENCOUNTER — APPOINTMENT (OUTPATIENT)
Dept: MAMMOGRAPHY | Facility: CLINIC | Age: 71
End: 2021-12-31
Payer: MEDICARE

## 2021-12-31 ENCOUNTER — APPOINTMENT (OUTPATIENT)
Dept: RADIOLOGY | Facility: CLINIC | Age: 71
End: 2021-12-31
Payer: MEDICARE

## 2021-12-31 DIAGNOSIS — Z90.710 ACQUIRED ABSENCE OF BOTH CERVIX AND UTERUS: Chronic | ICD-10-CM

## 2021-12-31 DIAGNOSIS — Z00.8 ENCOUNTER FOR OTHER GENERAL EXAMINATION: ICD-10-CM

## 2021-12-31 DIAGNOSIS — Z98.890 OTHER SPECIFIED POSTPROCEDURAL STATES: Chronic | ICD-10-CM

## 2021-12-31 PROCEDURE — 77067 SCR MAMMO BI INCL CAD: CPT | Mod: 26

## 2021-12-31 PROCEDURE — 77063 BREAST TOMOSYNTHESIS BI: CPT | Mod: 26

## 2021-12-31 PROCEDURE — 77067 SCR MAMMO BI INCL CAD: CPT

## 2021-12-31 PROCEDURE — 77063 BREAST TOMOSYNTHESIS BI: CPT

## 2022-01-27 ENCOUNTER — APPOINTMENT (OUTPATIENT)
Dept: SURGERY | Facility: CLINIC | Age: 72
End: 2022-01-27
Payer: MEDICARE

## 2022-01-27 PROCEDURE — 99214 OFFICE O/P EST MOD 30 MIN: CPT

## 2022-01-27 NOTE — PHYSICAL EXAM
[de-identified] : 1 cm right neck intradermal mass, well circumscribed [de-identified] : no palpable thyroid nodules [Laryngoscopy Performed] : laryngoscopy was performed, see procedure section for findings [Midline] : located in midline position [de-identified] : left lower lip mucosal scar with no evidence of recurrent disease. no new lesions noted [Normal] : orientation to person, place, and time: normal

## 2022-01-27 NOTE — HISTORY OF PRESENT ILLNESS
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 denies  bleeding, dysphagia, hoarseness. no changes medically since last visit.       no changes medically since last visit.  I have reviewed all old and new data and available images.\par

## 2022-01-28 ENCOUNTER — APPOINTMENT (OUTPATIENT)
Dept: RADIOLOGY | Facility: CLINIC | Age: 72
End: 2022-01-28
Payer: MEDICARE

## 2022-01-28 ENCOUNTER — OUTPATIENT (OUTPATIENT)
Dept: OUTPATIENT SERVICES | Facility: HOSPITAL | Age: 72
LOS: 1 days | End: 2022-01-28
Payer: MEDICARE

## 2022-01-28 DIAGNOSIS — Z98.890 OTHER SPECIFIED POSTPROCEDURAL STATES: Chronic | ICD-10-CM

## 2022-01-28 DIAGNOSIS — Z90.710 ACQUIRED ABSENCE OF BOTH CERVIX AND UTERUS: Chronic | ICD-10-CM

## 2022-01-28 DIAGNOSIS — Z00.8 ENCOUNTER FOR OTHER GENERAL EXAMINATION: ICD-10-CM

## 2022-01-28 PROCEDURE — 77080 DXA BONE DENSITY AXIAL: CPT

## 2022-01-28 PROCEDURE — 77080 DXA BONE DENSITY AXIAL: CPT | Mod: 26

## 2022-06-21 NOTE — ED ADULT NURSE NOTE - LEARNS BEST
Return Demostration Primary Defect Length In Cm (Final Defect Size - Required For Flaps/Grafts): 1.2

## 2022-11-18 ENCOUNTER — OFFICE (OUTPATIENT)
Dept: URBAN - METROPOLITAN AREA CLINIC 90 | Facility: CLINIC | Age: 72
Setting detail: OPHTHALMOLOGY
End: 2022-11-18
Payer: MEDICARE

## 2022-11-18 DIAGNOSIS — Z96.1: ICD-10-CM

## 2022-11-18 DIAGNOSIS — H11.32: ICD-10-CM

## 2022-11-18 PROBLEM — H25.12 CATARACT SENILE NUCLEAR SCLEROSIS; LEFT EYE: Status: ACTIVE | Noted: 2022-11-18

## 2022-11-18 PROBLEM — H16.223: Status: ACTIVE | Noted: 2022-11-18

## 2022-11-18 PROCEDURE — 99024 POSTOP FOLLOW-UP VISIT: CPT | Performed by: OPHTHALMOLOGY

## 2022-11-18 ASSESSMENT — REFRACTION_CURRENTRX
OS_ADD: +1.75
OS_VPRISM_DIRECTION: PROGS
OS_SPHERE: +2.25
OS_OVR_VA: 20/
OD_SPHERE: +0.75
OD_CYLINDER: -1.50
OD_ADD: +1.75
OS_CYLINDER: -1.25
OS_OVR_VA: 20/
OD_CYLINDER: -0.75
OD_VPRISM_DIRECTION: PROGS
OS_ADD: +3.00
OS_AXIS: 148
OS_AXIS: 145
OD_OVR_VA: 20/
OD_AXIS: 020
OD_SPHERE: +3.75
OD_ADD: +3.00
OD_CYLINDER: -1.50
OS_VPRISM_DIRECTION: PROGS
OS_ADD: +2.75
OS_AXIS: 139
OD_OVR_VA: 20/
OD_ADD: +2.75
OD_SPHERE: +3.50
OD_AXIS: 010
OS_CYLINDER: -0.50
OS_ADD: +2.75
OD_AXIS: 010
OS_CYLINDER: -0.50
OD_SPHERE: +0.75
OS_SPHERE: +3.00
OS_SPHERE: +3.00
OD_ADD: +2.75
OS_AXIS: 146
OD_VPRISM_DIRECTION: PROGS
OS_SPHERE: +3.25
OS_VPRISM_DIRECTION: PROGS
OD_CYLINDER: -0.75
OS_OVR_VA: 20/
OD_VPRISM_DIRECTION: PROGS
OS_CYLINDER: -0.50
OD_AXIS: 023
OD_VPRISM_DIRECTION: PROGS
OD_OVR_VA: 20/
OS_VPRISM_DIRECTION: PROGS

## 2022-11-18 ASSESSMENT — AXIALLENGTH_DERIVED
OS_AL: 23.3154
OS_AL: 23.411
OD_AL: 23.3127
OD_AL: 23.9478
OD_AL: 24.2013
OS_AL: 23.3631
OD_AL: 24.3043
OS_AL: 23.411
OS_AL: 23.6044
OD_AL: 23.9478

## 2022-11-18 ASSESSMENT — LID POSITION - PTOSIS
OD_PTOSIS: RUL T
OS_PTOSIS: LUL T

## 2022-11-18 ASSESSMENT — REFRACTION_MANIFEST
OD_CYLINDER: -0.75
OS_VA2: 20/30
OD_ADD: +3.00
OD_ADD: +3.00
OD_CYLINDER: -1.25
OD_SPHERE: +1.25
OD_SPHERE: +0.50
OS_SPHERE: +2.50
OD_VA1: 20/50
OD_SPHERE: PLANO
OD_AXIS: 015
OS_AXIS: 140
OS_VA1: 20/30+2
OD_SPHERE: +1.00
OS_SPHERE: +2.25
OD_VA1: 20/40+
OD_SPHERE: +3.00
OD_CYLINDER: -0.75
OS_AXIS: 140
OD_VA2: 20/20
OD_AXIS: 005
OS_CYLINDER: -0.50
OD_AXIS: 015
OS_SPHERE: +1.75
OS_AXIS: 140
OS_VA1: 20/40+
OS_VA2: 20/20
OS_CYLINDER: -0.50
OD_VA2: 20/30(J2)
OS_AXIS: 142
OS_CYLINDER: -0.50
OD_VA1: 20/40+2
OS_ADD: +3.00
OD_VA2: 20/30
OS_ADD: +3.00
OD_VA1: 20/30+1
OD_CYLINDER: -0.50
OS_VA1: 20/30+1
OD_ADD: +3.00
OS_ADD: +3.00
OD_VA1: 20/25-
OU_VA: 20/25-2
OS_SPHERE: +2.50
OD_AXIS: 060
OS_CYLINDER: -0.75
OS_VA1: 20/30+
OD_AXIS: 028
OD_CYLINDER: -1.00

## 2022-11-18 ASSESSMENT — REFRACTION_AUTOREFRACTION
OD_CYLINDER: -0.25
OS_AXIS: 157
OS_CYLINDER: -0.50
OD_SPHERE: 0.00
OD_AXIS: 177
OS_SPHERE: +2.25

## 2022-11-18 ASSESSMENT — SUPERFICIAL PUNCTATE KERATITIS (SPK)
OS_SPK: 1+
OD_SPK: 1+

## 2022-11-18 ASSESSMENT — SPHEQUIV_DERIVED
OD_SPHEQUIV: -0.125
OS_SPHEQUIV: 2
OS_SPHEQUIV: 2.25
OD_SPHEQUIV: 0.125
OS_SPHEQUIV: 1.5
OD_SPHEQUIV: 0.75
OD_SPHEQUIV: 0.75
OS_SPHEQUIV: 2.125
OD_SPHEQUIV: 2.375
OS_SPHEQUIV: 2

## 2022-11-18 ASSESSMENT — KERATOMETRY
OD_K1POWER_DIOPTERS: 41.00
OD_K2POWER_DIOPTERS: 42.50
METHOD_AUTO_MANUAL: AUTO
OD_AXISANGLE_DEGREES: 107
OS_K2POWER_DIOPTERS: 42.50
OS_K1POWER_DIOPTERS: 41.25
OS_AXISANGLE_DEGREES: 081

## 2022-11-18 ASSESSMENT — CORNEAL EDEMA CLINICAL DESCRIPTION: OD_CORNEALEDEMA: ABSENT

## 2022-11-18 ASSESSMENT — CORNEAL PTERYGIUM: OS_PTERYGIUM: NASAL 2MM 1MM

## 2022-11-18 ASSESSMENT — VISUAL ACUITY
OD_BCVA: 20/40+1
OS_BCVA: 20/20-1

## 2023-02-23 ENCOUNTER — APPOINTMENT (OUTPATIENT)
Dept: SURGERY | Facility: CLINIC | Age: 73
End: 2023-02-23
Payer: MEDICARE

## 2023-02-23 PROCEDURE — 99214 OFFICE O/P EST MOD 30 MIN: CPT

## 2023-02-23 NOTE — HISTORY OF PRESENT ILLNESS
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 denies  bleeding, dysphagia, hoarseness. no changes medically since last visit.       no changes medically since last visit.  I have reviewed all old and new data and available images.\par

## 2023-02-23 NOTE — PHYSICAL EXAM
[de-identified] : 1 cm right neck intradermal mass, well circumscribed [de-identified] : no palpable thyroid nodules [Laryngoscopy Performed] : laryngoscopy was performed, see procedure section for findings [Midline] : located in midline position [de-identified] : left lower lip mucosal scar with no evidence of recurrent disease. no new lesions noted [Normal] : orientation to person, place, and time: normal

## 2023-02-23 NOTE — END OF VISIT
"Chief Complaint   Patient presents with     Depression     Panel Management     MyChart, Flu, DAP, PHQ9       Initial /82 (BP Location: Right arm, Patient Position: Sitting, Cuff Size: Adult Regular)  Pulse 70  Temp 98.2  F (36.8  C) (Temporal)  Resp 20  Ht 5' 4\" (1.626 m)  Wt 229 lb 8 oz (104.1 kg)  BMI 39.39 kg/m2 Estimated body mass index is 39.39 kg/(m^2) as calculated from the following:    Height as of this encounter: 5' 4\" (1.626 m).    Weight as of this encounter: 229 lb 8 oz (104.1 kg).  Medication Reconciliation: complete   Rossi Briggs CMA      " [Time Spent: ___ minutes] : I have spent [unfilled] minutes of time on the encounter.

## 2023-02-25 ENCOUNTER — OFFICE (OUTPATIENT)
Dept: URBAN - METROPOLITAN AREA CLINIC 90 | Facility: CLINIC | Age: 73
Setting detail: OPHTHALMOLOGY
End: 2023-02-25
Payer: MEDICARE

## 2023-02-25 DIAGNOSIS — H02.403: ICD-10-CM

## 2023-02-25 DIAGNOSIS — H16.223: ICD-10-CM

## 2023-02-25 DIAGNOSIS — H11.042: ICD-10-CM

## 2023-02-25 DIAGNOSIS — H57.02: ICD-10-CM

## 2023-02-25 DIAGNOSIS — H25.12: ICD-10-CM

## 2023-02-25 DIAGNOSIS — H40.013: ICD-10-CM

## 2023-02-25 DIAGNOSIS — H02.822: ICD-10-CM

## 2023-02-25 PROCEDURE — 92133 CPTRZD OPH DX IMG PST SGM ON: CPT | Performed by: OPHTHALMOLOGY

## 2023-02-25 PROCEDURE — 92014 COMPRE OPH EXAM EST PT 1/>: CPT | Performed by: OPHTHALMOLOGY

## 2023-02-25 PROCEDURE — 92083 EXTENDED VISUAL FIELD XM: CPT | Performed by: OPHTHALMOLOGY

## 2023-02-25 ASSESSMENT — REFRACTION_CURRENTRX
OD_AXIS: 010
OS_CYLINDER: -0.50
OD_ADD: +2.75
OD_SPHERE: +3.75
OS_VPRISM_DIRECTION: PROGS
OD_OVR_VA: 20/
OD_CYLINDER: -0.75
OS_AXIS: 139
OS_AXIS: 145
OS_SPHERE: +2.25
OD_VPRISM_DIRECTION: PROGS
OS_SPHERE: +3.00
OD_ADD: +2.75
OS_CYLINDER: -0.50
OS_VPRISM_DIRECTION: PROGS
OS_ADD: +2.75
OD_CYLINDER: -1.50
OD_CYLINDER: -1.50
OD_AXIS: 012
OS_OVR_VA: 20/
OS_ADD: +1.75
OD_AXIS: 010
OD_SPHERE: +0.50
OD_SPHERE: +3.50
OS_ADD: +3.00
OD_VPRISM_DIRECTION: PROGS
OD_SPHERE: +0.75
OD_AXIS: 023
OS_CYLINDER: -0.50
OD_ADD: +1.75
OD_ADD: +2.50
OD_SPHERE: +0.75
OD_VPRISM_DIRECTION: PROGS
OD_OVR_VA: 20/
OS_AXIS: 139
OS_ADD: +2.50
OS_OVR_VA: 20/
OD_CYLINDER: -0.75
OS_AXIS: 146
OD_VPRISM_DIRECTION: PROGS
OS_ADD: +2.75
OS_VPRISM_DIRECTION: PROGS
OD_OVR_VA: 20/
OS_SPHERE: +3.00
OS_VPRISM_DIRECTION: PROGS
OS_OVR_VA: 20/
OD_ADD: +3.00
OS_CYLINDER: -0.50
OS_CYLINDER: -1.25
OD_CYLINDER: -0.75
OS_AXIS: 148
OS_SPHERE: +3.25
OD_VPRISM_DIRECTION: PROGS
OD_AXIS: 020
OS_SPHERE: +2.25
OS_VPRISM_DIRECTION: PROGS

## 2023-02-25 ASSESSMENT — REFRACTION_MANIFEST
OS_AXIS: 140
OS_CYLINDER: -0.50
OS_VA1: 20/30+1
OD_CYLINDER: -1.25
OD_CYLINDER: -0.75
OD_VA1: 20/50
OD_AXIS: 005
OS_VA1: 20/30+
OS_VA2: 20/20
OD_VA2: 20/20
OS_SPHERE: +2.50
OD_SPHERE: PLANO
OD_VA2: 20/30
OS_SPHERE: +2.50
OS_VA1: 20/30+2
OS_AXIS: 140
OD_AXIS: 015
OD_ADD: +3.00
OD_SPHERE: +3.00
OS_AXIS: 142
OD_VA1: 20/25-
OS_CYLINDER: -0.50
OS_SPHERE: +1.75
OD_AXIS: 060
OD_VA1: 20/40+2
OS_VA2: 20/30
OS_SPHERE: +2.25
OD_AXIS: 015
OU_VA: 20/25-2
OD_SPHERE: +1.25
OD_CYLINDER: -1.00
OS_AXIS: 140
OS_VA1: 20/40+
OS_ADD: +3.00
OD_SPHERE: +1.00
OD_CYLINDER: -0.75
OS_ADD: +3.00
OD_VA2: 20/30(J2)
OS_ADD: +3.00
OS_CYLINDER: -0.75
OD_ADD: +3.00
OD_AXIS: 028
OD_ADD: +3.00
OD_CYLINDER: -0.50
OD_SPHERE: +0.50
OD_VA1: 20/40+
OD_VA1: 20/30+1
OS_CYLINDER: -0.50

## 2023-02-25 ASSESSMENT — CONFRONTATIONAL VISUAL FIELD TEST (CVF)
OD_FINDINGS: FULL
OS_FINDINGS: FULL

## 2023-02-25 ASSESSMENT — SPHEQUIV_DERIVED
OS_SPHEQUIV: 1.5
OS_SPHEQUIV: 2.125
OD_SPHEQUIV: 2.375
OS_SPHEQUIV: 2
OD_SPHEQUIV: 0.125
OD_SPHEQUIV: 0.75
OS_SPHEQUIV: 2.25
OS_SPHEQUIV: 1.875

## 2023-02-25 ASSESSMENT — KERATOMETRY
OD_AXISANGLE_DEGREES: 103
OS_AXISANGLE_DEGREES: 008
OD_K1POWER_DIOPTERS: 41.50
METHOD_AUTO_MANUAL: AUTO
OS_K2POWER_DIOPTERS: 43.00
OD_K2POWER_DIOPTERS: 43.25

## 2023-02-25 ASSESSMENT — VISUAL ACUITY
OS_BCVA: 20/20-1
OD_BCVA: 20/40+1

## 2023-02-25 ASSESSMENT — CORNEAL EDEMA CLINICAL DESCRIPTION: OD_CORNEALEDEMA: ABSENT

## 2023-02-25 ASSESSMENT — AXIALLENGTH_DERIVED
OD_AL: 23.0907
OD_AL: 23.7135
OD_AL: 23.7135
OD_AL: 23.9621
OD_AL: 23.7135

## 2023-02-25 ASSESSMENT — REFRACTION_AUTOREFRACTION
OD_SPHERE: +1.50
OD_CYLINDER: -1.50
OS_CYLINDER: -0.25
OS_AXIS: 102
OS_SPHERE: +2.00
OD_AXIS: 010

## 2023-02-25 ASSESSMENT — LID POSITION - PTOSIS
OD_PTOSIS: RUL T
OS_PTOSIS: LUL T

## 2023-02-25 ASSESSMENT — CORNEAL PTERYGIUM: OS_PTERYGIUM: NASAL 2MM 1MM

## 2023-03-10 NOTE — PHYSICAL EXAM
[de-identified] : 1 cm right neck intradermal mass, well circumscribed [de-identified] : no palpable thyroid nodules [Laryngoscopy Performed] : laryngoscopy was performed, see procedure section for findings [Midline] : located in midline position [de-identified] : left lower lip mucosal scar with no evidence of recurrent disease. no new lesions noted [Normal] : orientation to person, place, and time: normal Outpatient PT

## 2023-09-08 NOTE — ED ADULT TRIAGE NOTE - DIRECT TO ROOM CARE INITIATED:
----- Message from Suraj Buckley sent at 9/8/2023 11:05 AM CDT -----  Regarding: The Mom is having trouble geting the chewable meds.  The Pharmacy is out Vyvance 60 Mg chewable tablets, but they do have 40mg chewable tablets. The Mom has called around to all the pharmacies and none have the 60 Mg tablets, but they may have the capsules. The patient is out of her medicine.  Please call [phone], [phone]     29-Jun-2017 22:10

## 2023-10-10 ENCOUNTER — OUTPATIENT (OUTPATIENT)
Dept: OUTPATIENT SERVICES | Facility: HOSPITAL | Age: 73
LOS: 1 days | End: 2023-10-10
Payer: MEDICARE

## 2023-10-10 ENCOUNTER — APPOINTMENT (OUTPATIENT)
Dept: ULTRASOUND IMAGING | Facility: CLINIC | Age: 73
End: 2023-10-10
Payer: MEDICARE

## 2023-10-10 ENCOUNTER — APPOINTMENT (OUTPATIENT)
Dept: MAMMOGRAPHY | Facility: CLINIC | Age: 73
End: 2023-10-10
Payer: MEDICARE

## 2023-10-10 ENCOUNTER — APPOINTMENT (OUTPATIENT)
Dept: RADIOLOGY | Facility: CLINIC | Age: 73
End: 2023-10-10
Payer: MEDICARE

## 2023-10-10 DIAGNOSIS — Z98.890 OTHER SPECIFIED POSTPROCEDURAL STATES: Chronic | ICD-10-CM

## 2023-10-10 DIAGNOSIS — Z00.8 ENCOUNTER FOR OTHER GENERAL EXAMINATION: ICD-10-CM

## 2023-10-10 DIAGNOSIS — Z90.710 ACQUIRED ABSENCE OF BOTH CERVIX AND UTERUS: Chronic | ICD-10-CM

## 2023-10-10 PROCEDURE — 76536 US EXAM OF HEAD AND NECK: CPT | Mod: 26

## 2023-10-10 PROCEDURE — 76536 US EXAM OF HEAD AND NECK: CPT

## 2023-10-10 PROCEDURE — 77063 BREAST TOMOSYNTHESIS BI: CPT | Mod: 26

## 2023-10-10 PROCEDURE — 77067 SCR MAMMO BI INCL CAD: CPT | Mod: 26

## 2023-10-10 PROCEDURE — 73560 X-RAY EXAM OF KNEE 1 OR 2: CPT | Mod: 26,RT

## 2023-10-10 PROCEDURE — 77067 SCR MAMMO BI INCL CAD: CPT

## 2023-10-10 PROCEDURE — 77063 BREAST TOMOSYNTHESIS BI: CPT

## 2023-10-10 PROCEDURE — 73560 X-RAY EXAM OF KNEE 1 OR 2: CPT

## 2023-11-09 ENCOUNTER — OFFICE (OUTPATIENT)
Dept: URBAN - METROPOLITAN AREA CLINIC 90 | Facility: CLINIC | Age: 73
Setting detail: OPHTHALMOLOGY
End: 2023-11-09
Payer: MEDICARE

## 2023-11-09 DIAGNOSIS — H40.013: ICD-10-CM

## 2023-11-09 DIAGNOSIS — H02.822: ICD-10-CM

## 2023-11-09 DIAGNOSIS — H16.223: ICD-10-CM

## 2023-11-09 DIAGNOSIS — H57.02: ICD-10-CM

## 2023-11-09 DIAGNOSIS — H02.403: ICD-10-CM

## 2023-11-09 DIAGNOSIS — H25.12: ICD-10-CM

## 2023-11-09 DIAGNOSIS — H11.042: ICD-10-CM

## 2023-11-09 PROCEDURE — 92014 COMPRE OPH EXAM EST PT 1/>: CPT | Performed by: OPHTHALMOLOGY

## 2023-11-09 PROCEDURE — 92250 FUNDUS PHOTOGRAPHY W/I&R: CPT | Performed by: OPHTHALMOLOGY

## 2023-11-09 ASSESSMENT — REFRACTION_MANIFEST
OS_ADD: +3.00
OD_ADD: +3.00
OS_AXIS: 140
OD_CYLINDER: -0.75
OD_VA1: 20/40+2
OS_ADD: +3.00
OS_SPHERE: +2.50
OS_SPHERE: +2.50
OD_AXIS: 015
OD_CYLINDER: -0.50
OS_CYLINDER: -0.75
OD_VA1: 20/50
OD_VA1: 20/40+
OS_VA1: 20/30+2
OD_AXIS: 060
OS_VA2: 20/20
OS_ADD: +3.00
OD_CYLINDER: -0.75
OD_VA2: 20/30(J2)
OD_ADD: +3.00
OS_AXIS: 140
OS_SPHERE: +2.25
OD_AXIS: 005
OD_AXIS: 015
OU_VA: 20/25-2
OD_CYLINDER: -1.00
OD_CYLINDER: -1.25
OS_VA1: 20/30+
OD_ADD: +3.00
OD_SPHERE: +1.25
OS_AXIS: 140
OD_SPHERE: +0.50
OS_VA2: 20/30
OS_CYLINDER: -0.50
OS_SPHERE: +1.75
OD_SPHERE: +1.00
OD_AXIS: 028
OD_VA2: 20/30
OS_CYLINDER: -0.50
OD_VA2: 20/20
OD_VA1: 20/30+1
OS_VA1: 20/30+1
OS_CYLINDER: -0.50
OS_VA1: 20/40+
OD_SPHERE: PLANO
OS_AXIS: 142
OD_VA1: 20/25-
OD_SPHERE: +3.00

## 2023-11-09 ASSESSMENT — REFRACTION_CURRENTRX
OS_ADD: +2.50
OD_OVR_VA: 20/
OD_AXIS: 023
OD_SPHERE: +3.75
OD_CYLINDER: -0.75
OD_VPRISM_DIRECTION: PROGS
OD_CYLINDER: -0.75
OS_AXIS: 146
OS_SPHERE: +2.25
OD_VPRISM_DIRECTION: PROGS
OS_SPHERE: +2.25
OS_SPHERE: +3.00
OS_OVR_VA: 20/
OS_CYLINDER: -1.25
OS_VPRISM_DIRECTION: PROGS
OS_SPHERE: +3.00
OD_AXIS: 020
OS_ADD: +2.75
OS_CYLINDER: -0.50
OS_ADD: +2.75
OD_AXIS: 010
OS_SPHERE: +3.25
OS_AXIS: 145
OD_ADD: +2.75
OD_SPHERE: +0.75
OS_VPRISM_DIRECTION: PROGS
OD_OVR_VA: 20/
OS_CYLINDER: -0.50
OD_ADD: +2.50
OD_CYLINDER: -1.50
OD_ADD: +1.75
OS_OVR_VA: 20/
OS_ADD: +1.75
OS_AXIS: 139
OS_CYLINDER: -0.50
OS_AXIS: 139
OD_CYLINDER: -0.75
OD_ADD: +3.00
OD_VPRISM_DIRECTION: PROGS
OS_AXIS: 148
OS_CYLINDER: -0.50
OD_AXIS: 012
OD_SPHERE: +3.50
OS_VPRISM_DIRECTION: PROGS
OD_OVR_VA: 20/
OS_OVR_VA: 20/
OD_ADD: +2.75
OD_VPRISM_DIRECTION: PROGS
OS_ADD: +3.00
OD_AXIS: 010
OD_SPHERE: +0.75
OS_VPRISM_DIRECTION: PROGS
OD_SPHERE: +0.50
OD_CYLINDER: -1.50
OD_VPRISM_DIRECTION: PROGS
OS_VPRISM_DIRECTION: PROGS

## 2023-11-09 ASSESSMENT — REFRACTION_AUTOREFRACTION
OS_CYLINDER: -0.25
OD_AXIS: 006
OD_CYLINDER: -1.75
OD_SPHERE: +1.50
OS_SPHERE: +2.00
OS_AXIS: 063

## 2023-11-09 ASSESSMENT — SPHEQUIV_DERIVED
OS_SPHEQUIV: 2
OS_SPHEQUIV: 1.875
OS_SPHEQUIV: 1.5
OD_SPHEQUIV: 0.625
OD_SPHEQUIV: 2.375
OD_SPHEQUIV: 0.125
OD_SPHEQUIV: 0.75
OS_SPHEQUIV: 2.25
OS_SPHEQUIV: 2.125
OD_SPHEQUIV: 0.75

## 2023-11-09 ASSESSMENT — CORNEAL EDEMA CLINICAL DESCRIPTION: OD_CORNEALEDEMA: ABSENT

## 2023-11-09 ASSESSMENT — LID POSITION - PTOSIS
OS_PTOSIS: LUL T
OD_PTOSIS: RUL T

## 2023-11-09 ASSESSMENT — CONFRONTATIONAL VISUAL FIELD TEST (CVF)
OS_FINDINGS: FULL
OD_FINDINGS: FULL

## 2023-11-09 ASSESSMENT — CORNEAL PTERYGIUM: OS_PTERYGIUM: NASAL 2MM 1MM

## 2024-01-01 NOTE — ED ADULT NURSE NOTE - CHPI ED NUR DURATION
"SUBJECTIVE:  Subjective  Bill Salas is a 2 m.o. male who is here with parents for Well Child and Dry Scalp    HPI  Current concerns include dry scaly scalp.- washing scalp and hair everyday.    Nutrition:  Current diet:breast milk and formula - 8 ozs /bottle   Difficulties with feeding? No    Elimination:  Stool consistency and frequency: Normal    Sleep:no problems    Social Screening:  Current  arrangements: home with family    Caregiver concerns regarding:  Hearing? no  Vision? no   Motor skills? no  Behavior/Activity? no    Developmental Screenin/3/2024     9:30 AM 2024     8:56 AM   SWYC Milestones (2 months)   Makes sounds that let you know he or she is happy or upset very much    Seems happy to see you very much    Follows a moving toy with his or her eyes very much    Turns head to find the person who is talking very much    Holds head steady when being pulled up to a sitting position very much    Brings hands together very much    Laughs very much    Keeps head steady when held in a sitting position very much    Makes sounds like "ga," "ma," or "ba" very much    Looks when you call his or her name very much    (Patient-Entered) Total Development Score - 2 months  20     SWYC Developmental Milestones Result: No milestones cut scores for age on date of standardized screening. Consider further screening/referral if concerned.        Review of Systems  A comprehensive review of symptoms was completed and negative except as noted above.     OBJECTIVE:  Vital signs  Vitals:    24 0856   Temp: 97.6 °F (36.4 °C)   TempSrc: Tympanic   Weight: 6.65 kg (14 lb 10.6 oz)   Height: 1' 11.43" (0.595 m)   HC: 41 cm (16.14")   92%     Physical Exam  Constitutional:       General: He is active. He is not in acute distress.     Appearance: He is well-developed.   HENT:      Head: Normocephalic. No cranial deformity or facial anomaly. Anterior fontanelle is flat.      Right Ear: Tympanic " membrane normal.      Left Ear: Tympanic membrane normal.      Mouth/Throat:      Mouth: Mucous membranes are moist.      Pharynx: Oropharynx is clear.   Eyes:      General: Red reflex is present bilaterally.         Right eye: No discharge.         Left eye: No discharge.      Conjunctiva/sclera: Conjunctivae normal.      Pupils: Pupils are equal, round, and reactive to light.   Cardiovascular:      Rate and Rhythm: Normal rate.      Pulses: Pulses are strong.      Heart sounds: S1 normal and S2 normal. No murmur heard.  Pulmonary:      Effort: Pulmonary effort is normal.      Breath sounds: Normal breath sounds.   Abdominal:      General: Bowel sounds are normal. There is no distension.      Palpations: Abdomen is soft. There is no mass.      Tenderness: There is no abdominal tenderness.   Genitourinary:     Penis: Normal and uncircumcised.       Testes: Normal.      Comments: Tight prepuce   Musculoskeletal:         General: No deformity. Normal range of motion.      Cervical back: Normal range of motion and neck supple.      Right hip: Negative right Ortolani and negative right Talbot.      Left hip: Negative left Ortolani and negative left Talbot.   Lymphadenopathy:      Cervical: No cervical adenopathy.   Skin:     General: Skin is warm.      Capillary Refill: Capillary refill takes less than 2 seconds.      Turgor: Normal.      Coloration: Skin is not jaundiced or pale.      Findings: Rash (yellow,scaly thin patch over parietal area,) present.   Neurological:      Mental Status: He is alert.      Motor: No abnormal muscle tone.          ASSESSMENT/PLAN:  Bill was seen today for well child and dry scalp.    Diagnoses and all orders for this visit:    Encounter for well child check without abnormal findings    Need for vaccination  -     (VFC) PCV20 (Prevnar 20) IM vaccine (>/= 6 wks)  -     VFC-rotavirus live (ROTATEQ) vaccine 2 mL  -     VFC-dip,per(a)sph-vxwB-qyh-Hib(PF) (VAXELIS) 15 unit-5 unit- 10  mcg/0.5 mL vaccine 0.5 mL    Encounter for screening for global developmental delays (milestones)  -     SWYC-Developmental Test    Seborrhea capitis           Preventive Health Issues Addressed:  1. Anticipatory guidance discussed and a handout covering well-child issues for age was provided.    2. Growth and development were reviewed/discussed and are within acceptable ranges for age.    3. Immunizations and screening tests today: per orders.    Follow Up:  Follow up in about 2 months (around 2024) for 4 months.    Cradle cap:Discussed with parent chronic nature of seborrhea   Discussed strategies to control .  Suggest: shampoo and change bed sheet daily ,  Soften the cradle cap patch with baby oil for an hour before the bath, coomb it out the scales with soft brush , wash scalp with shampoo and dry well.  Keep face,ears and neck clean by wiping with wet rag after every feeding to prevent the irritation from scalp scales.  Recheck in office as prn  for worsening condition.         yesterday

## 2024-01-04 NOTE — ED PROVIDER NOTE - CPE EDP SKIN NORM
01/04/24                            Khushi Lee  [address] [address]    To Whom It May Concern:    This is to certify Khushi Lee was evaluated with Tasneem Kenyon NP on 01/04/24 and can return to school today after visit.     Electronically signed by:  Tasneem Kenyon NP  [facility]  [address] [place] [id number]  Dept Phone: [phone]       
normal...

## 2024-02-06 NOTE — PACU DISCHARGE NOTE - NSCLINEINSERTRD_GEN_ALL_CORE
Medication: alendronate 70 mg  passed protocol.   Last office visit date: 9/5/23  Next appointment scheduled?: Yes   Number of refills given: 0   
No

## 2024-02-20 ENCOUNTER — APPOINTMENT (OUTPATIENT)
Dept: SURGERY | Facility: CLINIC | Age: 74
End: 2024-02-20
Payer: MEDICARE

## 2024-02-20 DIAGNOSIS — C00.9 MALIGNANT NEOPLASM OF LIP, UNSPECIFIED: ICD-10-CM

## 2024-02-20 PROCEDURE — 99213 OFFICE O/P EST LOW 20 MIN: CPT

## 2024-02-20 NOTE — PHYSICAL EXAM
[de-identified] : 1 cm right neck intradermal mass, well circumscribed [de-identified] : no palpable thyroid nodules [Laryngoscopy Performed] : laryngoscopy was performed, see procedure section for findings [Midline] : located in midline position [de-identified] : left lower lip mucosal scar with no evidence of recurrent disease. no new lesions noted [Normal] : orientation to person, place, and time: normal

## 2024-02-20 NOTE — HISTORY OF PRESENT ILLNESS
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 denies  bleeding, dysphagia, hoarseness. no changes medically since last visit.  I have reviewed all old and new data and available images.

## 2024-05-25 ENCOUNTER — OFFICE (OUTPATIENT)
Dept: URBAN - METROPOLITAN AREA CLINIC 90 | Facility: CLINIC | Age: 74
Setting detail: OPHTHALMOLOGY
End: 2024-05-25
Payer: MEDICARE

## 2024-05-25 DIAGNOSIS — H16.223: ICD-10-CM

## 2024-05-25 DIAGNOSIS — H43.391: ICD-10-CM

## 2024-05-25 DIAGNOSIS — H57.02: ICD-10-CM

## 2024-05-25 DIAGNOSIS — H26.491: ICD-10-CM

## 2024-05-25 DIAGNOSIS — H02.403: ICD-10-CM

## 2024-05-25 DIAGNOSIS — H11.042: ICD-10-CM

## 2024-05-25 DIAGNOSIS — H40.013: ICD-10-CM

## 2024-05-25 DIAGNOSIS — H25.12: ICD-10-CM

## 2024-05-25 DIAGNOSIS — H02.822: ICD-10-CM

## 2024-05-25 PROCEDURE — 92014 COMPRE OPH EXAM EST PT 1/>: CPT | Performed by: OPHTHALMOLOGY

## 2024-05-25 PROCEDURE — 92133 CPTRZD OPH DX IMG PST SGM ON: CPT | Performed by: OPHTHALMOLOGY

## 2024-05-25 PROCEDURE — 92083 EXTENDED VISUAL FIELD XM: CPT | Performed by: OPHTHALMOLOGY

## 2024-05-25 PROCEDURE — 76514 ECHO EXAM OF EYE THICKNESS: CPT | Performed by: OPHTHALMOLOGY

## 2024-05-25 ASSESSMENT — CONFRONTATIONAL VISUAL FIELD TEST (CVF)
OS_FINDINGS: FULL
OD_FINDINGS: FULL

## 2024-05-25 ASSESSMENT — LID POSITION - PTOSIS
OS_PTOSIS: LUL T
OD_PTOSIS: RUL T

## 2024-10-06 NOTE — ASSESSMENT
[FreeTextEntry1] : will observe. no indication for any studies.   to return earlier if any change.  patient has been given the opportunity to ask questions, and all of the patient's questions have been answered to their satisfaction\par 
No

## 2024-10-31 ENCOUNTER — OFFICE (OUTPATIENT)
Age: 74
Setting detail: OPHTHALMOLOGY
End: 2024-10-31
Payer: MEDICARE

## 2024-10-31 DIAGNOSIS — H57.02: ICD-10-CM

## 2024-10-31 DIAGNOSIS — H25.12: ICD-10-CM

## 2024-10-31 DIAGNOSIS — H11.042: ICD-10-CM

## 2024-10-31 DIAGNOSIS — H40.013: ICD-10-CM

## 2024-10-31 PROCEDURE — 92014 COMPRE OPH EXAM EST PT 1/>: CPT | Performed by: OPHTHALMOLOGY

## 2024-10-31 PROCEDURE — 92250 FUNDUS PHOTOGRAPHY W/I&R: CPT | Performed by: OPHTHALMOLOGY

## 2024-10-31 ASSESSMENT — REFRACTION_MANIFEST
OD_ADD: +3.00
OS_AXIS: 140
OS_SPHERE: +2.50
OD_CYLINDER: -1.25
OS_CYLINDER: -0.75
OS_AXIS: 142
OD_CYLINDER: -0.75
OS_SPHERE: +1.75
OD_VA1: 20/25-
OS_VA1: 20/30+
OD_AXIS: 060
OS_VA1: 20/40+
OD_VA1: 20/40+2
OD_VA1: 20/40+
OS_AXIS: 140
OD_VA2: 20/20
OS_SPHERE: +2.25
OS_VA1: 20/30+1
OU_VA: 20/25-2
OD_SPHERE: PLANO
OD_AXIS: 005
OD_SPHERE: +1.25
OD_CYLINDER: -1.00
OS_ADD: +3.00
OD_CYLINDER: -0.50
OS_VA2: 20/20
OD_SPHERE: +0.50
OD_SPHERE: +1.00
OD_VA1: 20/30+1
OD_CYLINDER: -0.75
OD_VA2: 20/30
OS_CYLINDER: -0.50
OD_ADD: +3.00
OD_VA2: 20/30(J2)
OD_AXIS: 015
OS_AXIS: 140
OD_SPHERE: +3.00
OS_SPHERE: +2.50
OS_CYLINDER: -0.50
OS_VA2: 20/30
OS_VA1: 20/30+2
OD_VA1: 20/50
OS_ADD: +3.00
OD_AXIS: 028
OS_CYLINDER: -0.50
OD_AXIS: 015
OD_ADD: +3.00
OS_ADD: +3.00

## 2024-10-31 ASSESSMENT — REFRACTION_CURRENTRX
OS_SPHERE: +2.25
OD_AXIS: 020
OS_ADD: +3.00
OD_ADD: +2.50
OS_AXIS: 148
OS_CYLINDER: -1.25
OD_ADD: +3.00
OS_CYLINDER: -0.50
OD_OVR_VA: 20/
OD_SPHERE: +3.50
OS_VPRISM_DIRECTION: PROGS
OS_VPRISM_DIRECTION: PROGS
OS_AXIS: 139
OS_CYLINDER: -0.50
OS_AXIS: 139
OS_SPHERE: +3.00
OS_CYLINDER: -0.50
OS_OVR_VA: 20/
OD_OVR_VA: 20/
OD_CYLINDER: -0.75
OD_AXIS: 012
OD_SPHERE: +0.75
OD_CYLINDER: -0.75
OD_OVR_VA: 20/
OS_ADD: +1.75
OS_OVR_VA: 20/
OS_SPHERE: +2.25
OS_AXIS: 146
OS_VPRISM_DIRECTION: PROGS
OS_VPRISM_DIRECTION: PROGS
OD_ADD: +3.00
OS_OVR_VA: 20/
OS_ADD: +2.50
OS_VPRISM_DIRECTION: PROGS
OD_SPHERE: +0.50
OD_ADD: +1.75
OD_VPRISM_DIRECTION: PROGS
OD_CYLINDER: -0.75
OD_VPRISM_DIRECTION: PROGS
OS_ADD: +2.75
OS_CYLINDER: -0.50
OS_ADD: +3.00
OD_CYLINDER: -1.50
OD_AXIS: 023
OD_AXIS: 011
OD_SPHERE: +0.75
OD_VPRISM_DIRECTION: PROGS
OD_VPRISM_DIRECTION: PROGS
OD_AXIS: 010
OD_VPRISM_DIRECTION: PROGS
OD_CYLINDER: -0.75
OS_AXIS: 145
OS_SPHERE: +3.25
OD_ADD: +2.75
OS_SPHERE: +2.25
OD_SPHERE: +0.50

## 2024-10-31 ASSESSMENT — TONOMETRY
OD_IOP_MMHG: 10
OS_IOP_MMHG: 10

## 2024-10-31 ASSESSMENT — PACHYMETRY
OS_CT_UM: 582
OD_CT_CORRECTION: 0
OS_CT_CORRECTION: -3
OD_CT_UM: 543

## 2024-10-31 ASSESSMENT — VISUAL ACUITY
OD_BCVA: 20/60-2
OS_BCVA: 20/25+2

## 2024-10-31 ASSESSMENT — CONFRONTATIONAL VISUAL FIELD TEST (CVF)
OD_FINDINGS: FULL
OS_FINDINGS: FULL

## 2025-02-18 ENCOUNTER — APPOINTMENT (OUTPATIENT)
Dept: SURGERY | Facility: CLINIC | Age: 75
End: 2025-02-18
Payer: MEDICARE

## 2025-02-18 DIAGNOSIS — C00.9 MALIGNANT NEOPLASM OF LIP, UNSPECIFIED: ICD-10-CM

## 2025-02-18 PROCEDURE — 99213 OFFICE O/P EST LOW 20 MIN: CPT

## 2025-03-05 ENCOUNTER — OFFICE (OUTPATIENT)
Facility: LOCATION | Age: 75
Setting detail: OPHTHALMOLOGY
End: 2025-03-05
Payer: MEDICARE

## 2025-03-05 DIAGNOSIS — H43.391: ICD-10-CM

## 2025-03-05 DIAGNOSIS — H25.12: ICD-10-CM

## 2025-03-05 DIAGNOSIS — H57.02: ICD-10-CM

## 2025-03-05 DIAGNOSIS — H02.403: ICD-10-CM

## 2025-03-05 DIAGNOSIS — H02.822: ICD-10-CM

## 2025-03-05 DIAGNOSIS — H40.013: ICD-10-CM

## 2025-03-05 DIAGNOSIS — H16.223: ICD-10-CM

## 2025-03-05 DIAGNOSIS — H11.042: ICD-10-CM

## 2025-03-05 DIAGNOSIS — H26.491: ICD-10-CM

## 2025-03-05 PROCEDURE — 92083 EXTENDED VISUAL FIELD XM: CPT | Performed by: OPHTHALMOLOGY

## 2025-03-05 PROCEDURE — 92014 COMPRE OPH EXAM EST PT 1/>: CPT | Performed by: OPHTHALMOLOGY

## 2025-03-05 ASSESSMENT — REFRACTION_MANIFEST
OS_VA1: 20/40+
OD_ADD: +3.00
OS_SPHERE: +0.50
OD_ADD: +3.00
OS_CYLINDER: -0.50
OD_VA2: 20/20
OD_AXIS: 005
OD_AXIS: 015
OD_CYLINDER: -1.25
OS_VA2: 20/20
OS_CYLINDER: -0.50
OD_SPHERE: +1.50
OS_ADD: +3.00
OS_VA1: 20/50-
OD_SPHERE: +0.50
OS_SPHERE: +2.50
OD_CYLINDER: -1.50
OS_CYLINDER: -0.50
OS_CYLINDER: SPH
OS_ADD: +3.00
OS_AXIS: 140
OD_VA1: 20/25-
OD_VA1: 20/40+
OS_ADD: +3.00
OD_CYLINDER: -0.75
OD_VA2: 20/30(J2)
OD_CYLINDER: -1.00
OS_VA1: 20/30+2
OS_SPHERE: +2.50
OS_VA2: 20/30
OD_AXIS: 015
OD_AXIS: 028
OS_ADD: +3.00
OD_SPHERE: +1.00
OS_SPHERE: +1.75
OD_AXIS: 005
OD_SPHERE: +3.00
OU_VA: 20/25-2
OS_AXIS: 142
OS_AXIS: 140
OD_VA1: 20/30+1
OD_CYLINDER: -0.50
OD_ADD: +3.00
OD_VA2: 20/30
OS_VA1: 20/30+
OD_ADD: +3.00
OD_VA1: 20/40+2
OD_VA1: 20/50
OD_AXIS: 060
OS_SPHERE: +2.25
OD_VA1: 20/20-
OD_SPHERE: +1.25
OS_CYLINDER: -0.75
OS_AXIS: 140
OD_CYLINDER: -0.75
OD_SPHERE: PLANO
OS_VA1: 20/30+1

## 2025-03-05 ASSESSMENT — REFRACTION_CURRENTRX
OS_SPHERE: +2.25
OD_AXIS: 010
OS_AXIS: 139
OD_CYLINDER: -0.75
OS_CYLINDER: -0.50
OD_ADD: +3.00
OS_ADD: +3.00
OD_VPRISM_DIRECTION: PROGS
OS_VPRISM_DIRECTION: PROGS
OD_OVR_VA: 20/
OS_SPHERE: +2.25
OS_OVR_CYLINDER: -0.50
OS_ADD: +2.50
OS_OVR_VA: 20/
OS_OVR_AXIS: 142
OD_VPRISM_DIRECTION: PROGS
OS_SPHERE: +2.25
OD_SPHERE: +3.50
OS_CYLINDER: -0.50
OD_CYLINDER: -0.75
OS_CYLINDER: -0.50
OS_CYLINDER: -1.25
OD_SPHERE: +0.50
OD_ADD: +3.00
OD_CYLINDER: -0.75
OS_SPHERE: +3.00
OD_OVR_VA: 20/
OS_ADD: +3.00
OD_SPHERE: +0.50
OD_CYLINDER: -0.75
OS_ADD: +3.00
OD_AXIS: 023
OD_ADD: +2.75
OD_SPHERE: +0.50
OS_AXIS: 148
OD_ADD: +1.75
OS_CYLINDER: -0.50
OD_SPHERE: +0.75
OS_OVR_VA: 20/
OD_AXIS: 016
OS_ADD: +1.75
OS_VPRISM_DIRECTION: PROGS
OD_AXIS: 012
OD_ADD: +3.00
OD_VPRISM_DIRECTION: PROGS
OS_OVR_SPHERE: +2.25
OD_VPRISM_DIRECTION: PROGS
OD_HPRISM: 0.25
OD_OVR_SPHERE: +0.50
OD_OVR_VA: 20/
OS_CYLINDER: -0.50
OD_AXIS: 020
OS_VPRISM_DIRECTION: PROGS
OS_VPRISM_DIRECTION: PROGS
OS_OVR_VA: 20/
OD_VPRISM_DIRECTION: PROGS
OD_CYLINDER: -1.50
OS_VPRISM_DIRECTION: PROGS
OS_ADD: +2.75
OS_SPHERE: +3.25
OD_SPHERE: +0.75
OS_SPHERE: +2.25
OS_AXIS: 146
OS_AXIS: 145
OD_OVR_AXIS: 016
OS_AXIS: 142
OD_ADD: +2.50
OD_CYLINDER: -0.75
OD_AXIS: 011
OD_OVR_CYLINDER: -0.75
OS_AXIS: 139

## 2025-03-05 ASSESSMENT — PACHYMETRY
OS_CT_UM: 582
OS_CT_CORRECTION: -3
OD_CT_CORRECTION: 0
OD_CT_UM: 543

## 2025-03-05 ASSESSMENT — TONOMETRY
OS_IOP_MMHG: 11
OD_IOP_MMHG: 11

## 2025-03-05 ASSESSMENT — VISUAL ACUITY
OS_BCVA: 20/20
OD_BCVA: 20/50-2

## 2025-03-05 ASSESSMENT — KERATOMETRY
OD_K1POWER_DIOPTERS: 40.75
OD_K2POWER_DIOPTERS: 42.75
OS_AXISANGLE_DEGREES: 079
METHOD_AUTO_MANUAL: AUTO
OS_K1POWER_DIOPTERS: 41.50
OD_AXISANGLE_DEGREES: 096
OS_K2POWER_DIOPTERS: 43.00

## 2025-03-05 ASSESSMENT — REFRACTION_AUTOREFRACTION
OD_CYLINDER: -1.50
OS_CYLINDER: -0.25
OS_AXIS: 179
OS_SPHERE: +1.00
OD_SPHERE: +1.50
OD_AXIS: 006

## 2025-03-05 ASSESSMENT — CORNEAL PTERYGIUM: OS_PTERYGIUM: NASAL 1MM

## 2025-03-05 ASSESSMENT — CONFRONTATIONAL VISUAL FIELD TEST (CVF)
OS_FINDINGS: FULL
OD_FINDINGS: FULL

## 2025-03-05 ASSESSMENT — LID POSITION - PTOSIS
OD_PTOSIS: RUL T
OS_PTOSIS: LUL T

## 2025-04-02 ENCOUNTER — OFFICE (OUTPATIENT)
Facility: LOCATION | Age: 75
Setting detail: OPHTHALMOLOGY
End: 2025-04-02
Payer: MEDICARE

## 2025-04-02 DIAGNOSIS — H25.12: ICD-10-CM

## 2025-04-02 PROCEDURE — 92136 OPHTHALMIC BIOMETRY: CPT | Mod: 26,LT | Performed by: OPHTHALMOLOGY

## 2025-04-02 ASSESSMENT — REFRACTION_MANIFEST
OD_AXIS: 060
OD_AXIS: 015
OS_CYLINDER: SPH
OS_AXIS: 142
OD_ADD: +3.00
OU_VA: 20/25-2
OD_ADD: +3.00
OD_SPHERE: +1.00
OD_AXIS: 005
OD_VA1: 20/40+2
OS_ADD: +3.00
OS_CYLINDER: -0.75
OD_AXIS: 015
OS_AXIS: 140
OS_VA2: 20/30
OD_CYLINDER: -0.50
OS_VA1: 20/30+2
OD_SPHERE: +1.25
OS_SPHERE: +2.50
OS_ADD: +3.00
OD_SPHERE: PLANO
OS_VA2: 20/20
OD_SPHERE: +1.50
OD_AXIS: 005
OS_ADD: +3.00
OS_SPHERE: +1.75
OD_VA2: 20/20
OS_SPHERE: +2.50
OD_CYLINDER: -0.75
OS_SPHERE: +0.50
OD_VA1: 20/30+1
OD_VA1: 20/25-
OS_AXIS: 140
OS_VA1: 20/50-
OD_VA1: 20/40+
OS_VA1: 20/30+
OS_CYLINDER: -0.50
OD_SPHERE: +0.50
OD_CYLINDER: -1.25
OS_VA1: 20/30+1
OS_VA1: 20/40+
OD_ADD: +3.00
OS_CYLINDER: -0.50
OS_SPHERE: +2.25
OD_VA1: 20/20-
OS_ADD: +3.00
OS_AXIS: 140
OD_CYLINDER: -1.50
OD_CYLINDER: -1.00
OD_SPHERE: +3.00
OD_VA2: 20/30(J2)
OD_ADD: +3.00
OS_CYLINDER: -0.50
OD_CYLINDER: -0.75
OD_VA1: 20/50
OD_AXIS: 028
OD_VA2: 20/30

## 2025-04-02 ASSESSMENT — REFRACTION_CURRENTRX
OS_CYLINDER: -0.50
OS_VPRISM_DIRECTION: PROGS
OD_CYLINDER: -0.75
OD_VPRISM_DIRECTION: PROGS
OS_SPHERE: +2.25
OD_HPRISM: 0.25
OD_CYLINDER: -0.75
OS_AXIS: 139
OD_VPRISM_DIRECTION: PROGS
OS_AXIS: 148
OS_AXIS: 142
OD_ADD: +3.00
OD_ADD: +3.00
OD_CYLINDER: -1.00
OS_ADD: +3.00
OD_AXIS: 010
OS_OVR_VA: 20/
OS_VPRISM_DIRECTION: PROGS
OS_SPHERE: +2.25
OS_CYLINDER: -0.50
OS_SPHERE: +2.25
OS_VPRISM_DIRECTION: PROGS
OS_OVR_VA: 20/
OS_CYLINDER: -0.50
OD_SPHERE: +3.50
OD_SPHERE: +0.75
OS_CYLINDER: -0.50
OD_VPRISM_DIRECTION: PROGS
OD_OVR_VA: 20/
OS_ADD: +2.75
OS_AXIS: 142
OD_VPRISM_DIRECTION: PROGS
OD_ADD: +2.50
OD_SPHERE: +0.50
OS_AXIS: 146
OD_VPRISM_DIRECTION: PROGS
OS_ADD: +3.00
OS_VPRISM_DIRECTION: PROGS
OD_SPHERE: +0.75
OS_AXIS: 139
OD_OVR_AXIS: 016
OD_SPHERE: +1.00
OD_AXIS: 020
OS_SPHERE: +2.25
OD_AXIS: 026
OD_AXIS: 011
OS_VPRISM_DIRECTION: PROGS
OS_SPHERE: +2.25
OD_OVR_SPHERE: +0.50
OS_ADD: +3.25
OS_OVR_SPHERE: +2.25
OS_ADD: +3.00
OD_OVR_VA: 20/
OD_AXIS: 023
OS_CYLINDER: -1.25
OD_SPHERE: +0.50
OS_CYLINDER: -0.50
OD_CYLINDER: -0.75
OS_OVR_VA: 20/
OS_ADD: +2.50
OD_AXIS: 012
OD_ADD: +1.75
OD_VPRISM_DIRECTION: PROGS
OS_AXIS: 145
OD_CYLINDER: -0.75
OD_ADD: +2.75
OD_OVR_CYLINDER: -0.75
OS_CYLINDER: -0.50
OS_ADD: +1.75
OD_AXIS: 016
OS_OVR_CYLINDER: -0.50
OS_SPHERE: +3.00
OD_ADD: +3.00
OD_ADD: +3.25
OD_CYLINDER: -1.50
OS_VPRISM_DIRECTION: PROGS
OD_CYLINDER: -0.75
OS_SPHERE: +3.25
OD_OVR_VA: 20/
OS_OVR_AXIS: 142
OD_SPHERE: +0.50

## 2025-04-02 ASSESSMENT — REFRACTION_AUTOREFRACTION
OS_SPHERE: +1.00
OS_AXIS: 146
OS_CYLINDER: -0.25
OD_CYLINDER: -1.25
OD_AXIS: 180
OD_SPHERE: +0.75

## 2025-04-02 ASSESSMENT — KERATOMETRY
OS_K2POWER_DIOPTERS: 42.50
OD_AXISANGLE_DEGREES: 099
METHOD_AUTO_MANUAL: AUTO
OS_K1POWER_DIOPTERS: 41.25
OD_K1POWER_DIOPTERS: 41.25
OD_K2POWER_DIOPTERS: 43.75
OS_AXISANGLE_DEGREES: 077

## 2025-04-02 ASSESSMENT — PACHYMETRY
OS_CT_UM: 582
OS_CT_CORRECTION: -3
OD_CT_CORRECTION: 0
OD_CT_UM: 543

## 2025-04-02 ASSESSMENT — TONOMETRY
OS_IOP_MMHG: 13
OD_IOP_MMHG: 15

## 2025-04-02 ASSESSMENT — CONFRONTATIONAL VISUAL FIELD TEST (CVF)
OS_FINDINGS: FULL
OD_FINDINGS: FULL

## 2025-04-02 ASSESSMENT — VISUAL ACUITY
OS_BCVA: 20/20
OD_BCVA: 20/50-2

## 2025-04-08 NOTE — ED ADULT TRIAGE NOTE - NS ED TRIAGE AVPU SCALE
Pt has a normal liver and kidney function.  Cholesterol within a healthy range, much improved!  Blood sugar normal.  Electrolytes appropriate.  Vitamin B12 is within normal limits, she may continue her vitamin B complex.  Encourage healthy diet and lifestyle.    medical evaluation Alert-The patient is alert, awake and responds to voice. The patient is oriented to time, place, and person. The triage nurse is able to obtain subjective information.

## 2025-04-11 ENCOUNTER — AMBULATORY SURGERY (OUTPATIENT)
Dept: URBAN - METROPOLITAN AREA SURGERY 14 | Facility: SURGERY | Age: 75
Setting detail: OPHTHALMOLOGY
End: 2025-04-11
Payer: MEDICARE

## 2025-04-11 DIAGNOSIS — H25.12: ICD-10-CM

## 2025-04-11 PROCEDURE — 66984 XCAPSL CTRC RMVL W/O ECP: CPT | Mod: LT | Performed by: OPHTHALMOLOGY

## 2025-04-12 ENCOUNTER — OFFICE (OUTPATIENT)
Facility: LOCATION | Age: 75
Setting detail: OPHTHALMOLOGY
End: 2025-04-12
Payer: MEDICARE

## 2025-04-12 ENCOUNTER — RX ONLY (RX ONLY)
Age: 75
End: 2025-04-12

## 2025-04-12 DIAGNOSIS — Z96.1: ICD-10-CM

## 2025-04-12 PROCEDURE — 99024 POSTOP FOLLOW-UP VISIT: CPT | Performed by: OPHTHALMOLOGY

## 2025-04-12 ASSESSMENT — REFRACTION_CURRENTRX
OD_VPRISM_DIRECTION: PROGS
OD_CYLINDER: -0.75
OS_AXIS: 139
OS_AXIS: 142
OD_AXIS: 023
OS_CYLINDER: -0.50
OS_AXIS: 146
OD_ADD: +3.25
OS_ADD: +3.00
OD_AXIS: 012
OS_VPRISM_DIRECTION: PROGS
OD_OVR_AXIS: 016
OS_SPHERE: +2.25
OD_HPRISM: 0.25
OS_AXIS: 139
OD_AXIS: 010
OD_OVR_CYLINDER: -0.75
OD_AXIS: 020
OS_OVR_VA: 20/
OD_CYLINDER: -0.75
OS_CYLINDER: -0.50
OS_SPHERE: +2.25
OD_AXIS: 016
OD_OVR_VA: 20/
OS_ADD: +3.25
OS_OVR_SPHERE: +2.25
OD_CYLINDER: -0.75
OD_SPHERE: +1.00
OS_OVR_CYLINDER: -0.50
OS_OVR_AXIS: 142
OD_AXIS: 011
OD_AXIS: 026
OS_VPRISM_DIRECTION: PROGS
OD_ADD: +3.00
OS_ADD: +3.00
OS_VPRISM_DIRECTION: PROGS
OD_SPHERE: +0.50
OS_ADD: +2.75
OD_ADD: +3.00
OD_ADD: +2.50
OD_ADD: +2.75
OD_CYLINDER: -1.00
OD_VPRISM_DIRECTION: PROGS
OD_CYLINDER: -0.75
OS_SPHERE: +3.25
OD_SPHERE: +0.75
OD_CYLINDER: -1.50
OS_SPHERE: +2.25
OS_CYLINDER: -0.50
OS_CYLINDER: -0.50
OS_AXIS: 148
OD_SPHERE: +0.50
OS_ADD: +1.75
OS_CYLINDER: -1.25
OS_SPHERE: +2.25
OS_AXIS: 145
OD_VPRISM_DIRECTION: PROGS
OS_AXIS: 142
OD_SPHERE: +0.50
OD_CYLINDER: -0.75
OD_VPRISM_DIRECTION: PROGS
OD_ADD: +3.00
OS_VPRISM_DIRECTION: PROGS
OS_OVR_VA: 20/
OD_ADD: +1.75
OS_VPRISM_DIRECTION: PROGS
OD_OVR_SPHERE: +0.50
OD_OVR_VA: 20/
OS_ADD: +2.50
OS_SPHERE: +3.00
OD_VPRISM_DIRECTION: PROGS
OD_OVR_VA: 20/
OS_CYLINDER: -0.50
OD_VPRISM_DIRECTION: PROGS
OS_VPRISM_DIRECTION: PROGS
OS_CYLINDER: -0.50
OD_SPHERE: +3.50
OD_SPHERE: +0.75
OS_OVR_VA: 20/
OS_ADD: +3.00
OS_SPHERE: +2.25

## 2025-04-12 ASSESSMENT — TONOMETRY: OS_IOP_MMHG: 14

## 2025-04-12 ASSESSMENT — REFRACTION_MANIFEST
OS_ADD: +3.00
OS_CYLINDER: -0.50
OS_ADD: +3.00
OD_CYLINDER: -0.75
OD_VA2: 20/30(J2)
OD_AXIS: 005
OD_SPHERE: +1.00
OS_VA1: 20/40+
OS_SPHERE: +1.75
OS_VA2: 20/20
OD_VA1: 20/30+1
OD_VA1: 20/40+
OS_CYLINDER: SPH
OD_SPHERE: +1.50
OS_CYLINDER: -0.75
OS_VA1: 20/30+2
OD_CYLINDER: -1.50
OD_CYLINDER: -0.50
OD_SPHERE: +3.00
OD_VA1: 20/25-
OD_SPHERE: PLANO
OD_AXIS: 015
OD_CYLINDER: -1.25
OS_ADD: +3.00
OD_ADD: +3.00
OS_VA1: 20/30+1
OD_SPHERE: +1.25
OD_VA2: 20/30
OD_VA2: 20/20
OS_VA1: 20/50-
OD_AXIS: 015
OD_ADD: +3.00
OD_AXIS: 028
OS_SPHERE: +2.50
OS_SPHERE: +0.50
OS_AXIS: 140
OD_AXIS: 060
OS_VA1: 20/30+
OD_ADD: +3.00
OS_SPHERE: +2.25
OD_VA1: 20/50
OD_VA1: 20/40+2
OS_AXIS: 140
OD_ADD: +3.00
OD_VA1: 20/20-
OS_VA2: 20/30
OD_CYLINDER: -1.00
OD_AXIS: 005
OS_CYLINDER: -0.50
OS_AXIS: 142
OS_AXIS: 140
OD_SPHERE: +0.50
OS_ADD: +3.00
OS_CYLINDER: -0.50
OD_CYLINDER: -0.75
OU_VA: 20/25-2
OS_SPHERE: +2.50

## 2025-04-12 ASSESSMENT — KERATOMETRY
METHOD_AUTO_MANUAL: AUTO
OS_AXISANGLE_DEGREES: 082
OD_K2POWER_DIOPTERS: 43.00
OS_K1POWER_DIOPTERS: 41.00
OS_K2POWER_DIOPTERS: 42.50
OD_K1POWER_DIOPTERS: 41.00
OD_AXISANGLE_DEGREES: 103

## 2025-04-12 ASSESSMENT — CONFRONTATIONAL VISUAL FIELD TEST (CVF)
OD_FINDINGS: FULL
OS_FINDINGS: FULL

## 2025-04-12 ASSESSMENT — CORNEAL PTERYGIUM: OS_PTERYGIUM: NASAL 1MM

## 2025-04-12 ASSESSMENT — LID POSITION - PTOSIS
OS_PTOSIS: LUL T
OD_PTOSIS: RUL T

## 2025-04-12 ASSESSMENT — REFRACTION_AUTOREFRACTION
OD_AXIS: 180
OD_CYLINDER: -1.25
OS_AXIS: 146
OS_SPHERE: +1.00
OD_SPHERE: +0.75
OS_CYLINDER: -0.25

## 2025-04-12 ASSESSMENT — PACHYMETRY
OD_CT_UM: 543
OS_CT_CORRECTION: -3
OD_CT_CORRECTION: 0
OS_CT_UM: 582

## 2025-04-12 ASSESSMENT — CORNEAL EDEMA CLINICAL DESCRIPTION: OS_CORNEALEDEMA: T OVER MAIN INCISION

## 2025-04-12 ASSESSMENT — VISUAL ACUITY
OD_BCVA: 20/30
OS_BCVA: 20/25-1

## 2025-04-18 ENCOUNTER — RX ONLY (RX ONLY)
Age: 75
End: 2025-04-18

## 2025-04-18 ENCOUNTER — OFFICE (OUTPATIENT)
Facility: LOCATION | Age: 75
Setting detail: OPHTHALMOLOGY
End: 2025-04-18
Payer: MEDICARE

## 2025-04-18 DIAGNOSIS — Z96.1: ICD-10-CM

## 2025-04-18 PROCEDURE — 99024 POSTOP FOLLOW-UP VISIT: CPT | Performed by: OPHTHALMOLOGY

## 2025-04-18 ASSESSMENT — REFRACTION_CURRENTRX
OS_AXIS: 146
OD_OVR_AXIS: 016
OD_ADD: +3.00
OS_AXIS: 139
OD_CYLINDER: -0.75
OD_AXIS: 023
OD_ADD: +3.25
OD_VPRISM_DIRECTION: PROGS
OD_AXIS: 010
OS_VPRISM_DIRECTION: PROGS
OS_ADD: +3.00
OD_OVR_VA: 20/
OS_ADD: +3.25
OD_VPRISM_DIRECTION: PROGS
OS_CYLINDER: -0.50
OD_CYLINDER: -0.75
OD_VPRISM_DIRECTION: PROGS
OD_ADD: +3.00
OD_SPHERE: +1.00
OD_CYLINDER: -1.00
OD_SPHERE: +3.50
OS_OVR_VA: 20/
OS_CYLINDER: -0.50
OS_ADD: +2.50
OS_CYLINDER: -0.50
OS_VPRISM_DIRECTION: PROGS
OS_OVR_AXIS: 142
OS_ADD: +1.75
OD_AXIS: 011
OD_OVR_CYLINDER: -0.75
OS_CYLINDER: -0.50
OD_CYLINDER: -0.75
OS_ADD: +3.00
OS_AXIS: 142
OS_AXIS: 145
OS_ADD: +3.00
OS_SPHERE: +3.00
OD_SPHERE: +0.75
OD_VPRISM_DIRECTION: PROGS
OS_OVR_VA: 20/
OS_VPRISM_DIRECTION: PROGS
OD_SPHERE: +0.50
OS_CYLINDER: -0.50
OS_AXIS: 148
OS_SPHERE: +2.25
OS_AXIS: 139
OD_OVR_VA: 20/
OD_OVR_VA: 20/
OD_CYLINDER: -0.75
OD_VPRISM_DIRECTION: PROGS
OD_HPRISM: 0.25
OD_AXIS: 012
OS_OVR_VA: 20/
OS_VPRISM_DIRECTION: PROGS
OD_AXIS: 016
OS_CYLINDER: -1.25
OS_SPHERE: +2.25
OS_SPHERE: +2.25
OS_AXIS: 142
OS_OVR_CYLINDER: -0.50
OD_AXIS: 020
OS_SPHERE: +3.25
OD_ADD: +1.75
OD_CYLINDER: -1.50
OS_CYLINDER: -0.50
OD_SPHERE: +0.50
OD_ADD: +2.75
OS_ADD: +2.75
OD_ADD: +3.00
OD_AXIS: 026
OS_VPRISM_DIRECTION: PROGS
OD_VPRISM_DIRECTION: PROGS
OD_SPHERE: +0.50
OD_ADD: +2.50
OD_OVR_SPHERE: +0.50
OS_VPRISM_DIRECTION: PROGS
OS_SPHERE: +2.25
OS_SPHERE: +2.25
OS_OVR_SPHERE: +2.25
OD_CYLINDER: -0.75
OD_SPHERE: +0.75

## 2025-04-18 ASSESSMENT — REFRACTION_MANIFEST
OS_CYLINDER: -0.50
OS_VA1: 20/30+
OD_ADD: +3.00
OS_ADD: +3.00
OD_SPHERE: +3.00
OS_AXIS: 140
OS_AXIS: 140
OD_VA1: 20/40+
OD_CYLINDER: -0.50
OS_SPHERE: +2.50
OS_VA2: 20/20
OD_VA2: 20/20
OD_AXIS: 015
OD_CYLINDER: -1.25
OS_CYLINDER: SPH
OD_VA1: 20/30+1
OD_SPHERE: PLANO
OD_CYLINDER: -1.00
OS_VA1: 20/50-
OD_ADD: +3.00
OS_CYLINDER: -0.50
OD_VA2: 20/30
OD_VA1: 20/50
OS_VA1: 20/30+1
OD_VA1: 20/25-
OD_AXIS: 015
OS_AXIS: 142
OD_VA1: 20/40+2
OS_SPHERE: +2.50
OD_VA2: 20/30(J2)
OS_SPHERE: +0.50
OS_VA1: 20/40+
OD_AXIS: 005
OD_AXIS: 060
OD_VA1: 20/20-
OD_AXIS: 028
OD_CYLINDER: -0.75
OD_SPHERE: +1.25
OS_SPHERE: +2.25
OD_SPHERE: +1.00
OD_ADD: +3.00
OS_ADD: +3.00
OS_AXIS: 140
OS_VA1: 20/30+2
OD_CYLINDER: -1.50
OD_AXIS: 005
OD_SPHERE: +1.50
OS_ADD: +3.00
OD_SPHERE: +0.50
OD_ADD: +3.00
OS_CYLINDER: -0.50
OS_CYLINDER: -0.75
OS_SPHERE: +1.75
OS_ADD: +3.00
OS_VA2: 20/30
OD_CYLINDER: -0.75
OU_VA: 20/25-2

## 2025-04-18 ASSESSMENT — KERATOMETRY
METHOD_AUTO_MANUAL: AUTO
OD_K1POWER_DIOPTERS: 41.00
OS_AXISANGLE_DEGREES: 082
OD_K2POWER_DIOPTERS: 43.00
OS_K1POWER_DIOPTERS: 41.00
OS_K2POWER_DIOPTERS: 42.50
OD_AXISANGLE_DEGREES: 103

## 2025-04-18 ASSESSMENT — PACHYMETRY
OS_CT_UM: 582
OD_CT_CORRECTION: 0
OD_CT_UM: 543
OS_CT_CORRECTION: -3

## 2025-04-18 ASSESSMENT — REFRACTION_AUTOREFRACTION
OD_AXIS: 180
OS_SPHERE: +1.00
OD_SPHERE: +0.75
OD_CYLINDER: -1.25
OS_AXIS: 146
OS_CYLINDER: -0.25

## 2025-04-18 ASSESSMENT — CORNEAL EDEMA CLINICAL DESCRIPTION: OS_CORNEALEDEMA: T OVER MAIN INCISION

## 2025-04-18 ASSESSMENT — CORNEAL PTERYGIUM: OS_PTERYGIUM: NASAL 1MM

## 2025-04-18 ASSESSMENT — LID POSITION - PTOSIS
OD_PTOSIS: RUL T
OS_PTOSIS: LUL T

## 2025-04-18 ASSESSMENT — TONOMETRY: OS_IOP_MMHG: 12

## 2025-04-18 ASSESSMENT — VISUAL ACUITY
OD_BCVA: 20/30+2
OS_BCVA: 20/25-1

## 2025-04-18 ASSESSMENT — CONFRONTATIONAL VISUAL FIELD TEST (CVF)
OD_FINDINGS: FULL
OS_FINDINGS: FULL

## 2025-04-25 ENCOUNTER — OUTPATIENT (OUTPATIENT)
Dept: OUTPATIENT SERVICES | Facility: HOSPITAL | Age: 75
LOS: 1 days | End: 2025-04-25
Payer: MEDICARE

## 2025-04-25 ENCOUNTER — APPOINTMENT (OUTPATIENT)
Dept: CT IMAGING | Facility: CLINIC | Age: 75
End: 2025-04-25
Payer: MEDICARE

## 2025-04-25 DIAGNOSIS — Z98.890 OTHER SPECIFIED POSTPROCEDURAL STATES: Chronic | ICD-10-CM

## 2025-04-25 DIAGNOSIS — Z00.8 ENCOUNTER FOR OTHER GENERAL EXAMINATION: ICD-10-CM

## 2025-04-25 DIAGNOSIS — Z90.710 ACQUIRED ABSENCE OF BOTH CERVIX AND UTERUS: Chronic | ICD-10-CM

## 2025-04-25 PROCEDURE — 75574 CT ANGIO HRT W/3D IMAGE: CPT | Mod: 26

## 2025-04-25 PROCEDURE — 75574 CT ANGIO HRT W/3D IMAGE: CPT | Mod: MH

## 2025-05-13 ENCOUNTER — OFFICE (OUTPATIENT)
Facility: LOCATION | Age: 75
Setting detail: OPHTHALMOLOGY
End: 2025-05-13
Payer: MEDICARE

## 2025-05-13 DIAGNOSIS — Z96.1: ICD-10-CM

## 2025-05-13 PROCEDURE — 99024 POSTOP FOLLOW-UP VISIT: CPT | Performed by: OPHTHALMOLOGY

## 2025-05-13 ASSESSMENT — REFRACTION_MANIFEST
OS_ADD: +3.00
OS_VA1: 20/30+2
OS_AXIS: 140
OD_SPHERE: +1.00
OD_CYLINDER: -1.75
OD_AXIS: 005
OU_VA: 20/25-2
OD_AXIS: 028
OS_SPHERE: +0.50
OS_VA2: 20/30
OD_VA1: 20/25+
OD_ADD: +3.00
OD_VA2: 20/30
OD_VA2: 20/20
OD_SPHERE: +3.00
OS_VA1: 20/20
OD_CYLINDER: -1.00
OS_AXIS: 165
OD_ADD: +3.00
OS_VA1: 20/30+1
OD_VA1: 20/30+1
OD_CYLINDER: -1.25
OD_CYLINDER: -0.50
OS_VA2: 20/20
OS_ADD: +3.00
OS_ADD: +3.00
OD_VA1: 20/40+2
OD_SPHERE: +1.25
OD_SPHERE: +1.50
OS_CYLINDER: -0.75
OS_AXIS: 142
OD_VA1: 20/25-
OS_CYLINDER: -0.50
OD_ADD: +3.00
OS_SPHERE: PLANO
OS_VA1: 20/40+
OD_AXIS: 015
OS_SPHERE: +2.50
OD_CYLINDER: -0.75
OD_SPHERE: +1.00
OD_AXIS: 005
OD_AXIS: 015
OD_ADD: +3.00
OS_SPHERE: +2.25
OD_VA1: 20/20-
OD_VA1: 20/50
OD_CYLINDER: -1.50
OS_CYLINDER: -0.50
OD_CYLINDER: -0.75
OS_AXIS: 140
OD_VA2: 20/30(J2)
OD_SPHERE: PLANO
OD_AXIS: 010
OS_VA1: 20/30+
OS_ADD: +3.00
OS_ADD: +3.00
OS_SPHERE: +2.50
OD_AXIS: 060
OD_VA1: 20/40+
OS_SPHERE: +1.75
OD_ADD: +3.00
OS_AXIS: 140
OD_SPHERE: +0.50
OS_CYLINDER: -1.75
OS_VA1: 20/50-
OS_CYLINDER: -0.50
OS_CYLINDER: SPH

## 2025-05-13 ASSESSMENT — REFRACTION_CURRENTRX
OD_AXIS: 023
OS_AXIS: 142
OS_ADD: +3.00
OS_AXIS: 139
OD_CYLINDER: -0.75
OS_CYLINDER: -0.50
OS_CYLINDER: -0.50
OD_HPRISM: 0.25
OS_AXIS: 139
OD_ADD: +3.25
OD_VPRISM_DIRECTION: PROGS
OD_CYLINDER: -0.75
OD_AXIS: 010
OS_ADD: +2.50
OS_SPHERE: +3.25
OS_SPHERE: +2.25
OD_ADD: +3.00
OS_SPHERE: +2.25
OS_AXIS: 142
OS_SPHERE: +2.25
OD_CYLINDER: -0.75
OS_OVR_CYLINDER: -0.50
OS_ADD: +3.00
OD_OVR_CYLINDER: -0.75
OD_ADD: +2.75
OS_OVR_VA: 20/
OS_CYLINDER: -0.50
OS_ADD: +1.75
OD_VPRISM_DIRECTION: PROGS
OD_AXIS: 026
OS_CYLINDER: -0.50
OD_SPHERE: +0.50
OD_CYLINDER: -1.50
OD_SPHERE: +0.50
OD_OVR_AXIS: 016
OD_CYLINDER: -1.00
OS_ADD: +3.00
OS_OVR_VA: 20/
OS_ADD: +2.75
OD_SPHERE: +0.75
OD_AXIS: 020
OD_OVR_SPHERE: +0.50
OD_ADD: +3.00
OD_AXIS: 016
OD_CYLINDER: -0.75
OD_AXIS: 012
OS_CYLINDER: -0.50
OD_ADD: +3.00
OD_SPHERE: +1.00
OD_VPRISM_DIRECTION: PROGS
OS_VPRISM_DIRECTION: PROGS
OS_CYLINDER: -1.25
OS_VPRISM_DIRECTION: PROGS
OS_VPRISM_DIRECTION: PROGS
OD_SPHERE: +3.50
OD_VPRISM_DIRECTION: PROGS
OS_VPRISM_DIRECTION: PROGS
OD_SPHERE: +0.50
OD_AXIS: 011
OD_CYLINDER: -0.75
OD_OVR_VA: 20/
OD_VPRISM_DIRECTION: PROGS
OD_VPRISM_DIRECTION: PROGS
OS_SPHERE: +3.00
OS_OVR_SPHERE: +2.25
OD_OVR_VA: 20/
OS_OVR_VA: 20/
OS_SPHERE: +2.25
OS_AXIS: 145
OD_ADD: +1.75
OS_CYLINDER: -0.50
OS_VPRISM_DIRECTION: PROGS
OS_AXIS: 146
OS_AXIS: 148
OS_ADD: +3.25
OD_OVR_VA: 20/
OS_VPRISM_DIRECTION: PROGS
OS_SPHERE: +2.25
OD_ADD: +2.50
OD_SPHERE: +0.75
OS_OVR_AXIS: 142

## 2025-05-13 ASSESSMENT — KERATOMETRY
METHOD_AUTO_MANUAL: AUTO
OS_K1POWER_DIOPTERS: 41.25
OD_K1POWER_DIOPTERS: 41.00
OS_K2POWER_DIOPTERS: 43.25
OS_AXISANGLE_DEGREES: 082
OD_K2POWER_DIOPTERS: 43.00
OD_AXISANGLE_DEGREES: 100

## 2025-05-13 ASSESSMENT — PACHYMETRY
OS_CT_CORRECTION: -3
OD_CT_UM: 543
OS_CT_UM: 582
OD_CT_CORRECTION: 0

## 2025-05-13 ASSESSMENT — REFRACTION_AUTOREFRACTION
OD_CYLINDER: -1.75
OS_AXIS: 167
OS_SPHERE: +0.50
OD_AXIS: 009
OD_SPHERE: +1.50
OS_CYLINDER: -1.50

## 2025-05-13 ASSESSMENT — CORNEAL PTERYGIUM: OS_PTERYGIUM: NASAL 1MM

## 2025-05-13 ASSESSMENT — LID POSITION - PTOSIS
OS_PTOSIS: LUL T
OD_PTOSIS: RUL T

## 2025-05-13 ASSESSMENT — CORNEAL EDEMA CLINICAL DESCRIPTION: OS_CORNEALEDEMA: T OVER MAIN INCISION

## 2025-05-13 ASSESSMENT — VISUAL ACUITY
OD_BCVA: 20/25-2
OS_BCVA: 20/25+2

## 2025-05-13 ASSESSMENT — CONFRONTATIONAL VISUAL FIELD TEST (CVF)
OS_FINDINGS: FULL
OD_FINDINGS: FULL

## 2025-05-13 ASSESSMENT — TONOMETRY
OS_IOP_MMHG: 12
OD_IOP_MMHG: 15

## 2025-05-16 ENCOUNTER — OUTPATIENT (OUTPATIENT)
Dept: OUTPATIENT SERVICES | Facility: HOSPITAL | Age: 75
LOS: 1 days | End: 2025-05-16
Payer: MEDICARE

## 2025-05-16 DIAGNOSIS — Z00.00 ENCOUNTER FOR GENERAL ADULT MEDICAL EXAMINATION WITHOUT ABNORMAL FINDINGS: ICD-10-CM

## 2025-05-16 DIAGNOSIS — Z98.890 OTHER SPECIFIED POSTPROCEDURAL STATES: Chronic | ICD-10-CM

## 2025-05-16 DIAGNOSIS — Z90.710 ACQUIRED ABSENCE OF BOTH CERVIX AND UTERUS: Chronic | ICD-10-CM

## 2025-05-16 PROCEDURE — 75580 N-INVAS EST C FFR SW ALY CTA: CPT | Mod: MA

## 2025-07-08 NOTE — H&P PST ADULT - HOW PATIENT ADDRESSED, PROFILE
Patient here with c/o lower back pain .  Patient reports she was bent over the tub washing her hair, stood up and had shooting back pain.  Patient was also at a concert tonight and had left arm numbness.   Vielka